# Patient Record
Sex: FEMALE | Race: WHITE | NOT HISPANIC OR LATINO | Employment: FULL TIME | URBAN - METROPOLITAN AREA
[De-identification: names, ages, dates, MRNs, and addresses within clinical notes are randomized per-mention and may not be internally consistent; named-entity substitution may affect disease eponyms.]

---
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
## 2023-06-01 ENCOUNTER — REFERRAL TRIAGE (OUTPATIENT)
Dept: LABOR AND DELIVERY | Facility: HOSPITAL | Age: 18
End: 2023-06-01

## 2023-06-01 ENCOUNTER — PATIENT OUTREACH (OUTPATIENT)
Dept: LABOR AND DELIVERY | Facility: HOSPITAL | Age: 18
End: 2023-06-01

## 2023-06-05 ENCOUNTER — PATIENT OUTREACH (OUTPATIENT)
Dept: LABOR AND DELIVERY | Facility: HOSPITAL | Age: 18
End: 2023-06-05
Payer: COMMERCIAL

## 2023-06-05 NOTE — OUTREACH NOTE
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 Yes   Dentist Name Denzinger   Have you seen a dentist in the last 6 months No  [has been within the past year, encouraged to make appointment for check up to support pregnancy health]   Resources Presently Utilizing: WIC (Women, Infant, Children)   Maternal Warning Signs Provided   Warning signs comment: sent in [employer]   Other: Provided  [second trimester, warning signs, nutrition, prenatal care, smoking/vaping]   Other Education How to find a pediatrician, Smoking/Vaping Cessation            Learning Assessment      Flowsheet Row Responses   Relationship Patient   Does the learner have any barriers to learning? No Barriers   What is the preferred language of the learner for medical teaching? English   Is an  required? No   How does the learner prefer to learn new concepts? Listening, Reading, Pictures/Video                Referral submitted to the following resources (verbal consent received to submit demographic information):     [facility]    Tobacco, Alcohol, and Drug History     reports that she quit smoking about 22 months ago. Her smoking use included cigarettes and electronic cigarette. She started smoking about 7 years ago. She has never used smokeless tobacco.   reports that she does not currently use alcohol.   reports that she does not currently use drugs after having used the following drugs: Marijuana.    Anitra Castellanos RN  Maternity Nurse Navigator    6/5/2023, 18:11 EDT

## 2023-06-05 NOTE — OUTREACH NOTE
[facility]  Enrollment    Current Estimated Gestational Age: 15w1d    Questions/Answers      Flowsheet Row Responses   Would like to participate? Yes   Date of Intake Visit 06/05/23                Anitra Castellanos RN  Maternity Nurse Navigator    6/5/2023, 16:56 EDT

## 2023-08-09 ENCOUNTER — APPOINTMENT (OUTPATIENT)
Dept: ULTRASOUND IMAGING | Facility: HOSPITAL | Age: 18
End: 2023-08-09
Payer: COMMERCIAL

## 2023-08-09 ENCOUNTER — HOSPITAL ENCOUNTER (OUTPATIENT)
Facility: HOSPITAL | Age: 18
Discharge: HOME | End: 2023-08-09
Attending: OBSTETRICS & GYNECOLOGY | Admitting: OBSTETRICS & GYNECOLOGY
Payer: COMMERCIAL

## 2023-08-09 ENCOUNTER — HOSPITAL ENCOUNTER (EMERGENCY)
Facility: HOSPITAL | Age: 18
Discharge: HOME | End: 2023-08-09
Attending: EMERGENCY MEDICINE
Payer: COMMERCIAL

## 2023-08-09 VITALS
TEMPERATURE: 98 F | HEIGHT: 66 IN | BODY MASS INDEX: 36.35 KG/M2 | HEART RATE: 71 BPM | DIASTOLIC BLOOD PRESSURE: 57 MMHG | SYSTOLIC BLOOD PRESSURE: 109 MMHG | OXYGEN SATURATION: 99 % | WEIGHT: 226.19 LBS | RESPIRATION RATE: 16 BRPM

## 2023-08-09 LAB
BILIRUB UR QL STRIP: NEGATIVE
CLARITY UR: ABNORMAL
COLOR UR: YELLOW
GLUCOSE UR STRIP-MCNC: NEGATIVE MG/DL
HGB UR QL STRIP.AUTO: NEGATIVE
KETONES UR QL STRIP: ABNORMAL
LEUKOCYTE ESTERASE UR QL STRIP.AUTO: NEGATIVE
NITRITE UR QL STRIP: NEGATIVE
PH UR STRIP.AUTO: 6.5 [PH] (ref 5–8)
PROT UR QL STRIP: NEGATIVE
SP GR UR STRIP: 1.02 (ref 1–1.03)
UROBILINOGEN UR QL STRIP: ABNORMAL

## 2023-08-09 PROCEDURE — 99211 OFF/OP EST MAY X REQ PHY/QHP: CPT | Performed by: EMERGENCY MEDICINE

## 2023-08-09 PROCEDURE — 87086 URINE CULTURE/COLONY COUNT: CPT | Performed by: OBSTETRICS & GYNECOLOGY

## 2023-08-09 PROCEDURE — 81003 URINALYSIS AUTO W/O SCOPE: CPT | Performed by: OBSTETRICS & GYNECOLOGY

## 2023-08-09 PROCEDURE — 76817 TRANSVAGINAL US OBSTETRIC: CPT

## 2023-08-09 PROCEDURE — G0463 HOSPITAL OUTPT CLINIC VISIT: HCPCS

## 2023-08-09 RX ORDER — PRENATAL VIT/IRON FUM/FOLIC AC 27MG-0.8MG
1 TABLET ORAL DAILY
COMMUNITY

## 2023-08-09 NOTE — LETTER
August 9, 2023     Patient: Trupti Rae   YOB: 2005   Date of Visit: 8/9/2023       To Whom It May Concern:    It is my medical opinion that Trupti Rae may return to work on 08/10/2023 . Please excuse her absence on 8/9/23.           Sincerely,    Mihaela PANDA RNC

## 2023-08-09 NOTE — NURSING NOTE
[facility] for vaginal bleeding this morning after having sex.  Pt states noting a blood clot, dk red, in toilet when she went to the bathroom.  Pt denies any further bleeding, none noted per RN.  Pt states occasional mild cramping. Pt states vomiting some the last 3 days. She states she can keep down water and some bland foods and string cheese.  No abdominal tenderness noted, abdomen soft to palpation per RN. Pt states (+) fm. Pt significant other at bedside and supportive.

## 2023-08-09 NOTE — LETTER
August 9, 2023     Patient: Trupti Rae   YOB: 2005   Date of Visit: 8/9/2023       To Whom It May Concern:    It is my medical opinion that Peter Cervantes may return to work on 08/10/2023 . Please excuse his absence on 8/9/23.           Sincerely,    Mihaela PANDA RNC

## 2023-08-10 LAB — BACTERIA SPEC AEROBE CULT: NO GROWTH

## 2023-08-14 ENCOUNTER — PATIENT OUTREACH (OUTPATIENT)
Dept: LABOR AND DELIVERY | Facility: HOSPITAL | Age: 18
End: 2023-08-14
Payer: COMMERCIAL

## 2023-08-14 NOTE — OUTREACH NOTE
[facility]  Unable to Reach       Questions/Answers      Flowsheet Row Responses   Pending Outreach Prenatal Check-in   Call Attempt First   Outcome No answer/busy, MyChart message sent to patient   Next Call Attempt Date 08/16/23            Attempt to reach patient for Pncheck in, phone rings and then busy signal, no option to leave voicemail.     Anitra Castellanos RN  Maternity Nurse Navigator    8/14/2023, 15:22 EDT

## 2023-08-16 ENCOUNTER — PATIENT OUTREACH (OUTPATIENT)
Dept: LABOR AND DELIVERY | Facility: HOSPITAL | Age: 18
End: 2023-08-16
Payer: COMMERCIAL

## 2023-08-16 NOTE — OUTREACH NOTE
[facility]  Unable to Reach       Questions/Answers      Flowsheet Row Responses   Pending Outreach Prenatal Check-in   Call Attempt Second   Outcome No answer/busy, MyChart message sent to patient   Unable to reach comments: phone rings and then is a busy signal, no option to leave a voicemail                Anitra Castellanos RN  Maternity Nurse Navigator    8/16/2023, 13:10 EDT

## 2023-09-21 ENCOUNTER — HOSPITAL ENCOUNTER (EMERGENCY)
Facility: HOSPITAL | Age: 18
Discharge: HOME | End: 2023-09-21
Payer: COMMERCIAL

## 2023-09-21 VITALS
DIASTOLIC BLOOD PRESSURE: 57 MMHG | SYSTOLIC BLOOD PRESSURE: 102 MMHG | OXYGEN SATURATION: 98 % | RESPIRATION RATE: 16 BRPM | HEIGHT: 66 IN | WEIGHT: 239.86 LBS | TEMPERATURE: 98.4 F | BODY MASS INDEX: 38.55 KG/M2 | HEART RATE: 76 BPM

## 2023-09-21 DIAGNOSIS — M54.32 SCIATICA OF LEFT SIDE: Primary | ICD-10-CM

## 2023-09-21 PROCEDURE — 99282 EMERGENCY DEPT VISIT SF MDM: CPT

## 2023-09-21 NOTE — ED PROVIDER NOTES
Subjective   History of Present Illness  Chief Complaint: Hip pain    Patient is a 18-year-old  female currently 30 weeks pregnant, with history of anxiety depression presents to the ER with complaints of left hip pain for a few days.  She denies any fall trauma or injury.  Patient states the pain is worse when she tries to get out of bed or go from sitting to standing.  She states the pain is mostly in the left buttocks area and sometimes radiates down the back of her leg.  She states the pain is worse with activity, rates a 4/10, currently at rest she has no pain.  She has no leg swelling calf pain or redness.  No groin pain or abdominal pain.  No saddle anesthesia or bladder or bowel dysfunction.  No chest pain or shortness of breath.    PCP: Barb Willingham    History provided by:  Patient    Review of Systems   Constitutional:  Negative for chills and fever.   HENT:  Negative for sore throat and trouble swallowing.    Eyes: Negative.    Respiratory:  Negative for shortness of breath and wheezing.    Cardiovascular:  Negative for chest pain.   Gastrointestinal:  Negative for abdominal pain, diarrhea, nausea and vomiting.   Endocrine: Negative.    Genitourinary:  Negative for dysuria.   Musculoskeletal:  Positive for arthralgias. Negative for back pain, myalgias and neck pain.   Skin:  Negative for rash.   Allergic/Immunologic: Negative.    Neurological:  Negative for headaches.   Psychiatric/Behavioral:  Negative for behavioral problems.    All other systems reviewed and are negative.    Past Medical History:   Diagnosis Date    Anxiety     Depression     Menstrual pain        Allergies   Allergen Reactions    Cashew Nut Swelling       Past Surgical History:   Procedure Laterality Date    ENDOSCOPY      WISDOM TOOTH EXTRACTION         Family History   Problem Relation Age of Onset    Diabetes Father     Mental illness Brother        Social History     Socioeconomic History    Marital status: Single      Spouse name: Peter Glass    Number of children: 0   Tobacco Use    Smoking status: Former     Types: Cigarettes, Electronic Cigarette     Start date: 2016     Quit date: 2021     Years since quittin.1    Smokeless tobacco: Never   Vaping Use    Vaping Use: Every day    Substances: Nicotine    Devices: Pre-filled or refillable cartridge   Substance and Sexual Activity    Alcohol use: Not Currently    Drug use: Not Currently     Types: Marijuana    Sexual activity: Yes     Partners: Male           Objective   Physical Exam  Vitals and nursing note reviewed.   Constitutional:       Appearance: Normal appearance. She is well-developed and normal weight. She is not ill-appearing or toxic-appearing.   HENT:      Head: Normocephalic and atraumatic.   Eyes:      Pupils: Pupils are equal, round, and reactive to light.   Cardiovascular:      Rate and Rhythm: Normal rate and regular rhythm.      Pulses: Normal pulses.      Heart sounds: Normal heart sounds. No murmur heard.  Pulmonary:      Effort: Pulmonary effort is normal. No respiratory distress.      Breath sounds: Normal breath sounds. No wheezing.   Abdominal:      General: Bowel sounds are normal. There is no distension.      Palpations: Abdomen is soft.      Tenderness: There is no abdominal tenderness.   Musculoskeletal:         General: Tenderness present. No signs of injury. Normal range of motion.      Right lower leg: No edema.      Left lower leg: No edema.      Comments: Lumbar spine: No step-offs or deformities, no midline tenderness to palpation.  Bilateral lower extremities: Negative straight leg raise.  5 out of 5 strength.  Sensation intact to light touch.  2+ reflexes.  No clonus.  Negative Babinski sign.    Homans' sign negative  Tenderness to palpation near piriformis and left buttocks   Skin:     General: Skin is warm and dry.      Capillary Refill: Capillary refill takes less than 2 seconds.      Findings: No rash.   Neurological:      " General: No focal deficit present.      Mental Status: She is alert and oriented to person, place, and time.   Psychiatric:         Mood and Affect: Mood normal.         Behavior: Behavior normal.       Procedures           ED Course  ED Course as of 09/21/23 1820   Thu Sep 21, 2023   0801 Contacted PT department, PT provider will not be in the ER for at least another hour [MC]      ED Course User Index  [MC] Joann Harp, PA    /57   Pulse 76   Temp 98.4 °F (36.9 °C) (Oral)   Resp 16   Ht 167.6 cm (66\")   Wt 109 kg (239 lb 13.8 oz)   LMP 07/23/2022 (Exact Date)   SpO2 98%   BMI 38.71 kg/m²   Labs Reviewed - No data to display  Medications - No data to display  No radiology results for the last day                                         Medical Decision Making  Differential Dx (Includes but not limited to): Fracture contusion, DVT, sciatica  Medical Records Reviewed: No pertinent records to review  Labs: N/A  Imaging: Considered, not done given no history of trauma, patient 30 weeks pregnant   Telemetry: N/A  Testing considered but not ordered: X-ray of the hip, patient is pregnant, did not want to radiate that area, physical exam and history consistent with sciatica  Nature of Complaint: Acute  Admission vs Discharge: Discharge  Discussion: While in the ED appropriate PPE was worn during exam and throughout all encounters with the patient.  Patient afebrile, nontoxic appearance in no acute distress.  She is complaining of left buttocks pain, HPI and physical exam consistent with sciatica.  There are no red flag warning signs, no saddle anesthesia, no bladder or bowel dysfunction, no weakness.  Patient able to ambulate without difficulty.  She denies abdominal pain vaginal bleeding or discharge.  Fetal heart tones 152.  Recommended NSAIDs therapy outpatient, specifically Tylenol, some light stretching and light heating pad.  She will be given ambulatory referral to physical therapy.  X-ray not " obtained as she has no history of trauma, patient is pregnant and wanted to avoid unnecessary radiation.    Discharge plan and instructions were discussed with the patient who verbalized understanding and is in agreement with the plan, all questions were answered at this time.  Patient is aware of signs symptoms that would require immediate return to the emergency room.  Patient understands importance of following up with primary care provider for further evaluation and worsening concerns as well as blood pressure recheck in the next 4 weeks.    Patient was discharged in improved stable condition with an upright steady gait.    Patient is aware that discharge does not mean that nothing is wrong but indicates no emergencies present and they must continue care with follow-up as given below or physician of their choice.    Problems Addressed:  Sciatica of left side: acute illness or injury    Risk  OTC drugs.        Final diagnoses:   Sciatica of left side       ED Disposition  ED Disposition       ED Disposition   Discharge    Condition   Stable    Comment   --               Barb Willingham MD  [address] [place] [address] [phone]    Schedule an appointment as soon as possible for a visit in 2 days  As needed, If symptoms worsen    Nay Porter MD  [address] [facility] [place] [address] [phone]    Schedule an appointment as soon as possible for a visit in 2 days  As needed, If symptoms worsen         Medication List      No changes were made to your prescriptions during this visit.            Joann Harp PA  09/21/23 5908

## 2023-09-21 NOTE — DISCHARGE INSTRUCTIONS
Tylenol as needed for pain.  May use heating pad to the hip and short increments for additional pain relief.  May also do light stretching.    I referred you to physical therapy, this could help with some pain control    Follow-up with PCP and OB/GYN for recheck    Return to the ER for new or worsening symptoms

## 2023-09-21 NOTE — Clinical Note
[facility] EMERGENCY DEPARTMENT  [address] [facility] [place] [address]  Phone: [phone]    Trupti Rae was seen and treated in our emergency department on 9/21/2023.  She may return to work on 09/22/2023.         Thank you for choosing [facility].    Eliza Mason RN

## 2023-09-25 ENCOUNTER — TREATMENT (OUTPATIENT)
Dept: PHYSICAL THERAPY | Facility: CLINIC | Age: 18
End: 2023-09-25

## 2023-09-25 DIAGNOSIS — M79.605 LEFT LEG PAIN: ICD-10-CM

## 2023-09-25 DIAGNOSIS — M54.32 LEFT SIDED SCIATICA: Primary | ICD-10-CM

## 2023-09-25 DIAGNOSIS — Z74.09 IMPAIRED MOBILITY AND ACTIVITIES OF DAILY LIVING: ICD-10-CM

## 2023-09-25 DIAGNOSIS — Z78.9 IMPAIRED MOBILITY AND ACTIVITIES OF DAILY LIVING: ICD-10-CM

## 2023-09-25 NOTE — PROGRESS NOTES
Physical Therapy Initial Evaluation and Plan of Care     [address] [facility] [address], [address] [place]  [address]    Patient: Trupti Rae   : 2005  Diagnosis/ICD-10 Code:  Left sided sciatica [M54.32]  Referring practitioner: Barb Willingham MD  Date of Initial Visit: 2023  Today's Date: 2023  Patient seen for 1 sessions           Subjective Questionnaire: Oswestry: 44%    Subjective Evaluation    History of Present Illness  Mechanism of injury: Pt reports she has had left hip and leg pain for 3-4 weeks.  She went to the ER and they said she had sciatica due to pregnancy.  She is 31 weeks.    She has difficulty with walking, getting out of bed and going from sit to stand      Patient Occupation: Registered behavior tech Pain  Current pain ratin  At best pain ratin  At worst pain rating: 10       PRECAUTIONS: anxiety, depression    Objective        Special Questions  Patient is experiencing disturbed sleep.     Additional Special Questions  When moving in bed      Tenderness     Additional Tenderness Details  TTP:  left SIJ; buttock; left lateral hip iliac crest    Active Range of Motion     Lumbar   Flexion: WFL  Extension: WFL and with pain  Left lateral flexion: WFL  Right lateral flexion: WFL    Strength/Myotome Testing     Lumbar   Left   Normal strength    Right   Normal strength    Tests     Lumbar     Left   Negative passive SLR and quadrant.     Right   Negative passive SLR and quadrant.     Left Pelvic Girdle/Sacrum   Positive: sacrum compression, gapping and sacral spring.     Right Pelvic Girdle/Sacrum   Positive: sacrum compression, gapping and sacral spring.      General Comments     Lumbar Comments  Pt anmb (I) without AD; some limping on LLE after sit to stand from mat table       Assessment & Plan       Assessment  Impairments: abnormal gait, activity intolerance, lacks appropriate home exercise program, pain with function, safety issue and weight-bearing intolerance   Functional  limitations: walking, sitting, standing, stooping and unable to perform repetitive tasks (Transition from sitting to standing)  Assessment details: Pt is a 17 y/o female with a dx of left side sciatica onset 3-4 weeks ago.  She is 31 weeks pregnant.    She has difficulty with walking, getting out of bed and going from sit to stand    Pt exhibits the above impairments and functional limitations and would benefit from skilled PT to address those areas and maximize function.   Prognosis: good    Goals  Plan Goals: STGs:  6 weeks  1.  Pt to tolerate initial HEP without inc pain.  2.  Pt to tolerate advancement of HEP without inc pain.  3.  Pt to report left hip and leg pain has decreased by at least 25%.  4.  Pt to improve stabilization of sacral base with flex to ext transitions as evidenced by neg SI compression and gapping testing.    5.  Pt to obtain SI belt and use regularly prior to next visit.      LTGs:  12 weeks  1.  Pt to be (I) with HEP to manage own condition.  2.  Pt to report left hip and leg pain has decreased by at least 75% to allow better tolerance to and performance of walking normally .  3.  Pt to improve stabilization of sacrum and pt reporting no pain upon standing from sitting or with moving in bed.    4.  Pt to exhibit improved function as indicated by improved score on Oswestry  vs score at evaluation.    5.  Pt to demonstrate (I) and appropriate use of body mechanics for daily home and/or work tasks.     Plan  Therapy options: will be seen for skilled therapy services  Planned modality interventions: cryotherapy and thermotherapy (hydrocollator packs)  Planned therapy interventions: abdominal trunk stabilization, body mechanics training, functional ROM exercises, home exercise program, ADL retraining, joint mobilization, manual therapy, motor coordination training, neuromuscular re-education, postural training, soft tissue mobilization, spinal/joint mobilization, strengthening, stretching and  therapeutic activities  Frequency: 2x week  Treatment plan discussed with: patient  Plan details: 12 weeks     See flowsheet for treatment details.    History # of Personal Factors and/or Comorbidities: LOW (0)  Examination of Body System(s): # of elements: LOW (1-2)  Clinical Presentation: STABLE   Clinical Decision Making: LOW     Timed:         Manual Therapy:         mins  28490;     Therapeutic Exercise:   10      mins  65335;     Neuromuscular Bobby:  8      mins  29835;    Therapeutic Activity:          mins  91255;     Gait Training:           mins  36866;     Ultrasound:          mins  10022;    Ionto:                                   mins   48506  Self Care:                     15       mins   39140    Un-Timed:  Electrical Stimulation:         mins  22357 ( );  Traction          mins 75968  Canalith Repos.          ___  mins  28861  Low Eval   30       Mins  61853  Mod Eval          Mins  59696  High Eval                            Mins  32510  Re-Eval                               mins  73608    Timed Treatment:  33    mins   Total Treatment:   63     mins    PT SIGNATURE: Zainab Hoffman, PT   [place] PT Lic. # [id number]    DATE TREATMENT INITIATED: 9/25/2023    Initial Certification  Certification Period: 12/23/2023  I certify that the therapy services are furnished while this patient is under my care.  The services outlined above are required by this patient, and will be reviewed every 90 days.     PHYSICIAN: Barb Willingham MD  NPI:                                        DATE:     Please sign and return via fax to [phone].. Thank you, [facility].

## 2023-09-28 ENCOUNTER — HOSPITAL ENCOUNTER (OUTPATIENT)
Facility: HOSPITAL | Age: 18
Discharge: HOME | End: 2023-09-28
Attending: STUDENT IN AN ORGANIZED HEALTH CARE EDUCATION/TRAINING PROGRAM | Admitting: STUDENT IN AN ORGANIZED HEALTH CARE EDUCATION/TRAINING PROGRAM
Payer: COMMERCIAL

## 2023-09-28 VITALS
OXYGEN SATURATION: 98 % | SYSTOLIC BLOOD PRESSURE: 114 MMHG | TEMPERATURE: 98.3 F | DIASTOLIC BLOOD PRESSURE: 62 MMHG | RESPIRATION RATE: 18 BRPM | HEART RATE: 71 BPM

## 2023-09-28 LAB — HGB F BLD QL KLEIH BETKE: NORMAL

## 2023-09-28 PROCEDURE — G0463 HOSPITAL OUTPT CLINIC VISIT: HCPCS

## 2023-09-28 PROCEDURE — 85460 HEMOGLOBIN FETAL: CPT | Performed by: STUDENT IN AN ORGANIZED HEALTH CARE EDUCATION/TRAINING PROGRAM

## 2023-09-28 RX ORDER — PROMETHAZINE HYDROCHLORIDE 25 MG/1
25 TABLET ORAL EVERY 8 HOURS PRN
COMMUNITY

## 2023-09-28 NOTE — LETTER
September 28, 2023     Patient: Trupti Rae   YOB: 2005   Date of Visit: 8/9/2023       To Whom It May Concern:    It is my medical opinion that Trupti Rae may return to work on 09/29/23 .           Sincerely,      Shannon Aburto RN

## 2023-09-28 NOTE — SIGNIFICANT NOTE
Notified MD that  presents at 31w4d s/p fall at 0700 this AM. Pt hit the right side of her abd and back. No front abd trauma. No bruising or lacerations present. Abd soft to palpate. RH positive. Orders received for KB stain and observe for 4hrs.

## 2023-09-28 NOTE — PLAN OF CARE
Goal Outcome Evaluation:  Plan of Care Reviewed With: patient, significant other        Progress: improving      Patient discharged home. Pt reports occasional cramping. Abd palpated soft, denies vag bleeding, + fm. KB stain negative. Category 1 strip

## 2023-10-04 ENCOUNTER — TREATMENT (OUTPATIENT)
Dept: PHYSICAL THERAPY | Facility: CLINIC | Age: 18
End: 2023-10-04
Payer: COMMERCIAL

## 2023-10-04 DIAGNOSIS — Z74.09 IMPAIRED MOBILITY AND ACTIVITIES OF DAILY LIVING: ICD-10-CM

## 2023-10-04 DIAGNOSIS — M79.605 LEFT LEG PAIN: ICD-10-CM

## 2023-10-04 DIAGNOSIS — M54.32 LEFT SIDED SCIATICA: Primary | ICD-10-CM

## 2023-10-04 DIAGNOSIS — Z78.9 IMPAIRED MOBILITY AND ACTIVITIES OF DAILY LIVING: ICD-10-CM

## 2023-10-04 NOTE — PROGRESS NOTES
Physical Therapy Daily Treatment Note    Patient: Trupti Rae   : 2005  Referring practitioner: SINDY Vazquez  Diagnoses: Left sided sciatica [M54.32]  Today's Date: 10/4/2023    VISIT#: 2    Subjective Evaluation    History of Present Illness  Mechanism of injury: Pt reports she has been doing some better but still with pain.  She got the belt and it seems to help some with walking and getting up from sitting.      Pain  Current pain ratin  Location: at rest;  3/10 sit to stand; 2-3/10 walking       Objective     See Exercise, Manual, and Modality Logs for complete treatment.     Assessed fit of belt; needed to move distally and pt was able to see and feel the difference with the new position.  Educ on importance of belt position    Trial of ex with belt on in supine:  less pain then without the belt    Trial of MET left LE flex and ext: flex inc pain and ext eased the pain  Pt will perform with assistance in sup and (I) in sitting    Assessment & Plan       Assessment  Assessment details: Mino well today with great improvement in pain with SI belt during walking and sit to stands from evaluation levels      Progress per Plan of Care        Timed:         Manual Therapy:         mins  30054;     Therapeutic Exercise:    8     mins  46051;     Neuromuscular Bobby:  12      mins  55403;    Therapeutic Activity:          mins  58437;     Gait Training:           mins  11487;     Ultrasound:          mins  67625;    Ionto                                   mins   77706  Self Care                      10      mins   46789      Un-Timed:  Electrical Stimulation:         mins  87254 ( );  Traction          mins 67518  Canalith Repos                   mins  32186  Low Eval          Mins  35569  Mod Eval          Mins  12812  High Eval                            Mins  05754  Re-Eval                               mins  94866    Timed Treatment:  30    mins   Total Treatment:    30    mins    Zainab  Dalton PT  Physical Therapist  [place] PT Lic. # [id number]

## 2023-10-09 ENCOUNTER — TELEPHONE (OUTPATIENT)
Dept: PHYSICAL THERAPY | Facility: CLINIC | Age: 18
End: 2023-10-09

## 2023-10-09 NOTE — TELEPHONE ENCOUNTER
Caller: Peter Glass    Relationship: Emergency Contact         What was the call regarding: LARY YAP

## 2023-10-23 ENCOUNTER — DOCUMENTATION (OUTPATIENT)
Dept: PHYSICAL THERAPY | Facility: CLINIC | Age: 18
End: 2023-10-23

## 2023-10-23 NOTE — PROGRESS NOTES
Discharge Summary  Discharge Summary from PhysicalTherapy    Patient: Trupti Rae   : 2005  Diagnosis/ICD-10 Code: L side sciatica    Today's Date: 10/23/2023    Patient seen for 2 visits.    Discharge Status of Patient: See 10-4-23 treatment note for detail.    Goals: Not Met    Discharge Plan: Patient to return to referring/providing physician    Comments:  pt called to say she cannot make it in for PT anymore due to now working full time hours.  DC PT at this time.    Thank you for this referral to [facility]    SIGNATURE: Zainab Hoffman, PT

## 2023-10-30 ENCOUNTER — PATIENT OUTREACH (OUTPATIENT)
Dept: LABOR AND DELIVERY | Facility: HOSPITAL | Age: 18
End: 2023-10-30
Payer: COMMERCIAL

## 2023-10-30 NOTE — OUTREACH NOTE
[facility]  Unable to Reach       Questions/Answers      Flowsheet Row Responses   Pending Outreach Prenatal Check-in   Call Attempt First   Outcome No answer/busy, Missing or invalid number   Unable to reach comments: number no longer in service                Anitra Castellanos RN  Maternity Nurse Navigator    10/30/2023, 12:39 EDT

## 2023-10-30 NOTE — OUTREACH NOTE
[facility]  Unable to Reach       Questions/Answers      Flowsheet Row Responses   Pending Outreach Prenatal Check-in   Call Attempt Second   Outcome Left message, Zipzoomhart message sent to patient   Next Call Attempt Date 11/01/23                Anitra Castellanos RN  Maternity Nurse Navigator    10/30/2023, 12:41 EDT

## 2023-11-01 ENCOUNTER — PATIENT OUTREACH (OUTPATIENT)
Dept: LABOR AND DELIVERY | Facility: HOSPITAL | Age: 18
End: 2023-11-01
Payer: COMMERCIAL

## 2023-11-01 NOTE — OUTREACH NOTE
[facility]  Unable to Reach       Questions/Answers      Flowsheet Row Responses   Pending Outreach Prenatal Check-in   Call Attempt Third   Outcome MyChart message sent to patient   Unable to reach comments: number disconnected                Anitra Castellanos RN  Maternity Nurse Navigator    11/1/2023, 13:41 EDT

## 2023-11-17 ENCOUNTER — HOSPITAL ENCOUNTER (INPATIENT)
Facility: HOSPITAL | Age: 18
LOS: 3 days | Discharge: HOME | End: 2023-11-20
Attending: STUDENT IN AN ORGANIZED HEALTH CARE EDUCATION/TRAINING PROGRAM | Admitting: STUDENT IN AN ORGANIZED HEALTH CARE EDUCATION/TRAINING PROGRAM
Payer: COMMERCIAL

## 2023-11-17 ENCOUNTER — HOSPITAL ENCOUNTER (OUTPATIENT)
Dept: LABOR AND DELIVERY | Facility: HOSPITAL | Age: 18
Discharge: HOME | End: 2023-11-17
Payer: COMMERCIAL

## 2023-11-17 PROBLEM — Z34.90 ENCOUNTER FOR INDUCTION OF LABOR: Status: ACTIVE | Noted: 2023-11-17

## 2023-11-17 LAB
ABO GROUP BLD: NORMAL
BLD GP AB SCN SERPL QL: NEGATIVE
DEPRECATED RDW RBC AUTO: 42 FL (ref 37–54)
ERYTHROCYTE [DISTWIDTH] IN BLOOD BY AUTOMATED COUNT: 13.6 % (ref 12.3–15.4)
HCT VFR BLD AUTO: 38.4 % (ref 34–46.6)
HGB BLD-MCNC: 13 G/DL (ref 12–15.9)
MCH RBC QN AUTO: 30.3 PG (ref 26.6–33)
MCHC RBC AUTO-ENTMCNC: 33.9 G/DL (ref 31.5–35.7)
MCV RBC AUTO: 89.2 FL (ref 79–97)
PLATELET # BLD AUTO: 280 10*3/MM3 (ref 140–450)
PMV BLD AUTO: 8.2 FL (ref 6–12)
RBC # BLD AUTO: 4.31 10*6/MM3 (ref 3.77–5.28)
RH BLD: POSITIVE
T&S EXPIRATION DATE: NORMAL
WBC NRBC COR # BLD AUTO: 12 10*3/MM3 (ref 3.4–10.8)

## 2023-11-17 PROCEDURE — 85027 COMPLETE CBC AUTOMATED: CPT | Performed by: STUDENT IN AN ORGANIZED HEALTH CARE EDUCATION/TRAINING PROGRAM

## 2023-11-17 PROCEDURE — 86900 BLOOD TYPING SEROLOGIC ABO: CPT | Performed by: STUDENT IN AN ORGANIZED HEALTH CARE EDUCATION/TRAINING PROGRAM

## 2023-11-17 PROCEDURE — 86900 BLOOD TYPING SEROLOGIC ABO: CPT

## 2023-11-17 PROCEDURE — 86901 BLOOD TYPING SEROLOGIC RH(D): CPT | Performed by: STUDENT IN AN ORGANIZED HEALTH CARE EDUCATION/TRAINING PROGRAM

## 2023-11-17 PROCEDURE — 86850 RBC ANTIBODY SCREEN: CPT | Performed by: STUDENT IN AN ORGANIZED HEALTH CARE EDUCATION/TRAINING PROGRAM

## 2023-11-17 PROCEDURE — 86901 BLOOD TYPING SEROLOGIC RH(D): CPT

## 2023-11-17 PROCEDURE — 3E033VJ INTRODUCTION OF OTHER HORMONE INTO PERIPHERAL VEIN, PERCUTANEOUS APPROACH: ICD-10-PCS | Performed by: STUDENT IN AN ORGANIZED HEALTH CARE EDUCATION/TRAINING PROGRAM

## 2023-11-17 PROCEDURE — 86592 SYPHILIS TEST NON-TREP QUAL: CPT | Performed by: STUDENT IN AN ORGANIZED HEALTH CARE EDUCATION/TRAINING PROGRAM

## 2023-11-17 RX ORDER — SODIUM CHLORIDE 0.9 % (FLUSH) 0.9 %
10 SYRINGE (ML) INJECTION EVERY 8 HOURS
Status: CANCELLED | OUTPATIENT
Start: 2023-11-17

## 2023-11-17 RX ORDER — SODIUM CHLORIDE 0.9 % (FLUSH) 0.9 %
10 SYRINGE (ML) INJECTION AS NEEDED
Status: CANCELLED | OUTPATIENT
Start: 2023-11-17

## 2023-11-17 RX ORDER — SODIUM CHLORIDE 0.9 % (FLUSH) 0.9 %
10 SYRINGE (ML) INJECTION AS NEEDED
Status: DISCONTINUED | OUTPATIENT
Start: 2023-11-17 | End: 2023-11-20 | Stop reason: HOSPADM

## 2023-11-17 RX ORDER — ACETAMINOPHEN 325 MG/1
650 TABLET ORAL EVERY 6 HOURS PRN
Status: CANCELLED | OUTPATIENT
Start: 2023-11-17

## 2023-11-17 RX ORDER — ACETAMINOPHEN 325 MG/1
650 TABLET ORAL EVERY 6 HOURS PRN
Status: DISCONTINUED | OUTPATIENT
Start: 2023-11-17 | End: 2023-11-20 | Stop reason: HOSPADM

## 2023-11-17 RX ORDER — SODIUM CHLORIDE 0.9 % (FLUSH) 0.9 %
10 SYRINGE (ML) INJECTION EVERY 12 HOURS SCHEDULED
Status: DISCONTINUED | OUTPATIENT
Start: 2023-11-17 | End: 2023-11-20

## 2023-11-17 RX ORDER — SODIUM CHLORIDE 0.9 % (FLUSH) 0.9 %
10 SYRINGE (ML) INJECTION EVERY 8 HOURS
Status: DISCONTINUED | OUTPATIENT
Start: 2023-11-17 | End: 2023-11-20

## 2023-11-17 RX ORDER — SODIUM CHLORIDE 0.9 % (FLUSH) 0.9 %
10 SYRINGE (ML) INJECTION EVERY 12 HOURS SCHEDULED
Status: CANCELLED | OUTPATIENT
Start: 2023-11-17

## 2023-11-17 RX ADMIN — DINOPROSTONE 10 MG: 10 INSERT VAGINAL at 21:35

## 2023-11-17 RX ADMIN — Medication 10 ML: at 21:26

## 2023-11-17 RX ADMIN — DOXYLAMINE SUCCINATE 25 MG: 25 TABLET ORAL at 22:48

## 2023-11-18 ENCOUNTER — ANESTHESIA (OUTPATIENT)
Dept: LABOR AND DELIVERY | Facility: HOSPITAL | Age: 18
End: 2023-11-18
Payer: COMMERCIAL

## 2023-11-18 ENCOUNTER — ANESTHESIA EVENT (OUTPATIENT)
Dept: LABOR AND DELIVERY | Facility: HOSPITAL | Age: 18
End: 2023-11-18
Payer: COMMERCIAL

## 2023-11-18 PROBLEM — O36.5990 FETAL GROWTH RESTRICTION ANTEPARTUM: Status: ACTIVE | Noted: 2023-11-18

## 2023-11-18 PROCEDURE — 25010000002 MORPHINE PER 10 MG: Performed by: STUDENT IN AN ORGANIZED HEALTH CARE EDUCATION/TRAINING PROGRAM

## 2023-11-18 PROCEDURE — 88307 TISSUE EXAM BY PATHOLOGIST: CPT | Performed by: STUDENT IN AN ORGANIZED HEALTH CARE EDUCATION/TRAINING PROGRAM

## 2023-11-18 PROCEDURE — 25810000003 LACTATED RINGERS PER 1000 ML: Performed by: STUDENT IN AN ORGANIZED HEALTH CARE EDUCATION/TRAINING PROGRAM

## 2023-11-18 PROCEDURE — 93005 ELECTROCARDIOGRAM TRACING: CPT | Performed by: STUDENT IN AN ORGANIZED HEALTH CARE EDUCATION/TRAINING PROGRAM

## 2023-11-18 PROCEDURE — 0HQ9XZZ REPAIR PERINEUM SKIN, EXTERNAL APPROACH: ICD-10-PCS | Performed by: STUDENT IN AN ORGANIZED HEALTH CARE EDUCATION/TRAINING PROGRAM

## 2023-11-18 PROCEDURE — 93010 ELECTROCARDIOGRAM REPORT: CPT | Performed by: INTERNAL MEDICINE

## 2023-11-18 PROCEDURE — C1755 CATHETER, INTRASPINAL: HCPCS | Performed by: ANESTHESIOLOGY

## 2023-11-18 RX ORDER — ONDANSETRON 4 MG/1
4 TABLET, FILM COATED ORAL EVERY 8 HOURS PRN
Status: DISCONTINUED | OUTPATIENT
Start: 2023-11-18 | End: 2023-11-20 | Stop reason: HOSPADM

## 2023-11-18 RX ORDER — FENTANYL 0.2 MG/100ML-BUPIV 0.125%-NACL 0.9% EPIDURAL INJ 2/0.125 MCG/ML-%
SOLUTION INJECTION CONTINUOUS
Status: DISCONTINUED | OUTPATIENT
Start: 2023-11-18 | End: 2023-11-20 | Stop reason: HOSPADM

## 2023-11-18 RX ORDER — BISACODYL 10 MG
10 SUPPOSITORY, RECTAL RECTAL DAILY PRN
Status: DISCONTINUED | OUTPATIENT
Start: 2023-11-19 | End: 2023-11-20 | Stop reason: HOSPADM

## 2023-11-18 RX ORDER — OXYTOCIN/0.9 % SODIUM CHLORIDE 30/500 ML
2-20 PLASTIC BAG, INJECTION (ML) INTRAVENOUS
Status: DISCONTINUED | OUTPATIENT
Start: 2023-11-18 | End: 2023-11-20 | Stop reason: HOSPADM

## 2023-11-18 RX ORDER — PRENATAL VIT/IRON FUM/FOLIC AC 27MG-0.8MG
1 TABLET ORAL DAILY
Status: DISCONTINUED | OUTPATIENT
Start: 2023-11-18 | End: 2023-11-20 | Stop reason: HOSPADM

## 2023-11-18 RX ORDER — OXYTOCIN/0.9 % SODIUM CHLORIDE 30/500 ML
999 PLASTIC BAG, INJECTION (ML) INTRAVENOUS ONCE
Status: DISCONTINUED | OUTPATIENT
Start: 2023-11-18 | End: 2023-11-18 | Stop reason: HOSPADM

## 2023-11-18 RX ORDER — MISOPROSTOL 200 UG/1
800 TABLET ORAL ONCE AS NEEDED
Status: DISCONTINUED | OUTPATIENT
Start: 2023-11-18 | End: 2023-11-18

## 2023-11-18 RX ORDER — METHYLERGONOVINE MALEATE 0.2 MG/ML
200 INJECTION INTRAVENOUS ONCE AS NEEDED
Status: DISCONTINUED | OUTPATIENT
Start: 2023-11-18 | End: 2023-11-18

## 2023-11-18 RX ORDER — FENTANYL 0.2 MG/100ML-BUPIV 0.125%-NACL 0.9% EPIDURAL INJ 2/0.125 MCG/ML-%
SOLUTION INJECTION
Status: COMPLETED
Start: 2023-11-18 | End: 2023-11-18

## 2023-11-18 RX ORDER — DOCUSATE SODIUM 100 MG/1
100 CAPSULE, LIQUID FILLED ORAL 2 TIMES DAILY
Status: DISCONTINUED | OUTPATIENT
Start: 2023-11-18 | End: 2023-11-20 | Stop reason: HOSPADM

## 2023-11-18 RX ORDER — LIDOCAINE HCL/EPINEPHRINE/PF 2%-1:200K
VIAL (ML) INJECTION
Status: DISPENSED
Start: 2023-11-18 | End: 2023-11-18

## 2023-11-18 RX ORDER — EPHEDRINE SULFATE 5 MG/ML
10 INJECTION INTRAVENOUS
Status: DISCONTINUED | OUTPATIENT
Start: 2023-11-18 | End: 2023-11-18

## 2023-11-18 RX ORDER — CARBOPROST TROMETHAMINE 250 UG/ML
250 INJECTION, SOLUTION INTRAMUSCULAR
Status: DISCONTINUED | OUTPATIENT
Start: 2023-11-18 | End: 2023-11-18

## 2023-11-18 RX ORDER — ACETAMINOPHEN 325 MG/1
650 TABLET ORAL EVERY 6 HOURS PRN
Status: DISCONTINUED | OUTPATIENT
Start: 2023-11-18 | End: 2023-11-18 | Stop reason: SDUPTHER

## 2023-11-18 RX ORDER — PRENATAL VIT/IRON FUM/FOLIC AC 27MG-0.8MG
1 TABLET ORAL DAILY
Status: DISCONTINUED | OUTPATIENT
Start: 2023-11-19 | End: 2023-11-18 | Stop reason: SDUPTHER

## 2023-11-18 RX ORDER — SODIUM CHLORIDE 0.9 % (FLUSH) 0.9 %
1-10 SYRINGE (ML) INJECTION AS NEEDED
Status: DISCONTINUED | OUTPATIENT
Start: 2023-11-18 | End: 2023-11-20 | Stop reason: HOSPADM

## 2023-11-18 RX ORDER — HYDROCORTISONE ACETATE PRAMOXINE HCL 2.5; 1 G/100G; G/100G
1 CREAM TOPICAL AS NEEDED
Status: DISCONTINUED | OUTPATIENT
Start: 2023-11-18 | End: 2023-11-20 | Stop reason: HOSPADM

## 2023-11-18 RX ORDER — SODIUM CHLORIDE, SODIUM LACTATE, POTASSIUM CHLORIDE, CALCIUM CHLORIDE 600; 310; 30; 20 MG/100ML; MG/100ML; MG/100ML; MG/100ML
125 INJECTION, SOLUTION INTRAVENOUS CONTINUOUS
Status: DISCONTINUED | OUTPATIENT
Start: 2023-11-18 | End: 2023-11-20 | Stop reason: HOSPADM

## 2023-11-18 RX ORDER — OXYTOCIN/0.9 % SODIUM CHLORIDE 30/500 ML
250 PLASTIC BAG, INJECTION (ML) INTRAVENOUS CONTINUOUS
Status: ACTIVE | OUTPATIENT
Start: 2023-11-18 | End: 2023-11-18

## 2023-11-18 RX ORDER — IBUPROFEN 600 MG/1
600 TABLET ORAL EVERY 6 HOURS PRN
Status: DISCONTINUED | OUTPATIENT
Start: 2023-11-18 | End: 2023-11-20 | Stop reason: HOSPADM

## 2023-11-18 RX ORDER — OXYTOCIN/0.9 % SODIUM CHLORIDE 30/500 ML
125 PLASTIC BAG, INJECTION (ML) INTRAVENOUS CONTINUOUS PRN
Status: DISCONTINUED | OUTPATIENT
Start: 2023-11-18 | End: 2023-11-20 | Stop reason: HOSPADM

## 2023-11-18 RX ADMIN — MORPHINE SULFATE 4 MG: 4 INJECTION, SOLUTION INTRAMUSCULAR; INTRAVENOUS at 04:57

## 2023-11-18 RX ADMIN — Medication 10 ML: at 20:43

## 2023-11-18 RX ADMIN — IBUPROFEN 600 MG: 600 TABLET, FILM COATED ORAL at 20:43

## 2023-11-18 RX ADMIN — SODIUM CHLORIDE, SODIUM LACTATE, POTASSIUM CHLORIDE, AND CALCIUM CHLORIDE 999 ML/HR: 600; 310; 30; 20 INJECTION, SOLUTION INTRAVENOUS at 08:27

## 2023-11-18 RX ADMIN — Medication 10 ML: at 04:58

## 2023-11-18 RX ADMIN — ACETAMINOPHEN 650 MG: 325 TABLET, FILM COATED ORAL at 04:19

## 2023-11-18 RX ADMIN — Medication: at 18:35

## 2023-11-18 RX ADMIN — WITCH HAZEL: 500 SOLUTION RECTAL; TOPICAL at 18:35

## 2023-11-18 RX ADMIN — Medication: at 16:08

## 2023-11-18 RX ADMIN — DOCUSATE SODIUM 100 MG: 100 CAPSULE, LIQUID FILLED ORAL at 20:43

## 2023-11-18 RX ADMIN — Medication 2 MILLI-UNITS/MIN: at 14:31

## 2023-11-18 RX ADMIN — SODIUM CHLORIDE, SODIUM LACTATE, POTASSIUM CHLORIDE, AND CALCIUM CHLORIDE 125 ML/HR: 600; 310; 30; 20 INJECTION, SOLUTION INTRAVENOUS at 09:27

## 2023-11-18 RX ADMIN — Medication 6 ML/HR: at 09:20
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
## 2023-11-18 NOTE — L&D DELIVERY NOTE
[facility]  Vaginal Delivery Note    Diagnosis     Patient is a 18 y.o. female  currently at 38w6d, who presented for IOL secondary to FGR.   Pregnancy c/b: FGR, tobacco use, depression, anxiety         Delivery     Delivery:  Spontaneous Vaginal Delivery    Date of Delivery:  2023 at 1723   Anesthesia:  Epidural   Delivering clinician: Nay Porter MD      Delivery narrative:  Patient presented for IOL, had cervidil to initiate induction. Patient had SROM with clear fluid. She continued to progress along normal labor curve with expectant management. Once completely dilated she pushed for approximately 30 minutes. She delivered a LVFI in a clean field, position EDUARD. Infants head delivered atraumatically, followed by delivery of the rest of the infants body. There were no nuchal cords. Cord was clamped and cut and infant was passed to mothers abdomen. Infant had weak cry, but good color, and tone, and was moving all 4 extremities. Cord blood and gases was obtained and sent for analysis. Placenta was delivered spontaneously and intact with a 3 vessel cord. Placenta was sent to pathology. Pitocin was given IV and fundal massage was applied for hemostasis. The vagina and rectum were examined and there was a right lateral sulcal tear and it was a repaired in a running locked fashion. There was also a first degree laceration and it was repaired in usual fashion. Good hemostasis was noted following delivery and repair.       Infant    Findings: VFI     Apgars: Apgars 9 and 9  Blood gases: venous: pH 7.324; arterial pH 7.17  Infant weight: 5.46 pounds      Placenta, Cord, and Fluid     Delayed cord clamping performed per routine.       Placenta delivered spontaneous, intact  3VC- sent to pathology, very thin umbilical cord      Bimanual massage and Pitocin 30 units in 500 mL bolus given after placental delivery.  There was good hemostasis and a firm uterine tone.        Lacerations       had a 1st degree  laceration, and right sulcal tear, repaired c 3.0 vicryl rapide in the usual fashion.    Quantitiative Blood Loss  283  mL     Sponge and needle counts correct x 2.     Disposition  Mother to Mother Baby/Postpartum  in stable condition currently.  Baby to remains with mom  in stable condition currently.      Nay Porter MD  11/18/23  17:56 EST

## 2023-11-18 NOTE — PLAN OF CARE
Goal Outcome Evaluation:         Pt unable to sleep through the night due to cxts becoming more painful.  Tylenol and one iv dose of morphine given for discomfort.  Pt resting quietly on her right side, no needs voiced.

## 2023-11-18 NOTE — ANESTHESIA PROCEDURE NOTES
Labor Epidural    Pre-sedation assessment completed: 11/18/2023 8:47 AM    Patient reassessed immediately prior to procedure    Patient location during procedure: OB  Start Time: 11/18/2023 8:48 AM  Stop Time: 11/18/2023 9:05 AM  Performed By  Anesthesiologist: Sid Cohen MD  Preanesthetic Checklist  Completed: patient identified, IV checked, site marked, risks and benefits discussed, surgical consent, monitors and equipment checked, pre-op evaluation and timeout performed  Prep:  Pt Position:sitting  Sterile Tech:cap, gloves, gown, mask and sterile barrier  Prep:chlorhexidine gluconate and isopropyl alcohol  Monitoring:blood pressure monitoring and continuous pulse oximetry  Epidural Block Procedure:  Approach:midline  Guidance:palpation technique  Location:L3-L4  Needle Type:Tuohy  Needle Gauge:17 G  Loss of Resistance Medium: air  Loss of Resistance: 8cm  Cath Depth at skin:13 cm  Paresthesia: none  Aspiration:negative  Test Dose:negative  Number of Attempts: 1

## 2023-11-18 NOTE — ANESTHESIA PREPROCEDURE EVALUATION
Anesthesia Evaluation     Patient summary reviewed and Nursing notes reviewed   NPO Solid Status: > 2 hours  NPO Liquid Status: > 2 hours           Airway   Mallampati: III  TM distance: >3 FB  Neck ROM: full  Dental - normal exam     Pulmonary - negative pulmonary ROS   (+) ,decreased breath sounds  Cardiovascular - normal exam        Neuro/Psych  (+) psychiatric history Anxiety, Depression and PTSD  GI/Hepatic/Renal/Endo    (+) obesity, renal disease-    Musculoskeletal (-) negative ROS    Abdominal   (+) obese   Substance History - negative use     OB/GYN    (+) Pregnant        Other - negative ROS                   Anesthesia Plan    ASA 2       Anesthetic plan, risks, benefits, and alternatives have been provided, discussed and informed consent has been obtained with: patient.    Plan discussed with CRNA.    CODE STATUS:    Code Status (Patient has no pulse and is not breathing): CPR (Attempt to Resuscitate)  Medical Interventions (Patient has pulse or is breathing): Full

## 2023-11-19 LAB
ALBUMIN SERPL-MCNC: 2.9 G/DL (ref 3.5–5.2)
ALBUMIN/GLOB SERPL: 1.1 G/DL
ALP SERPL-CCNC: 86 U/L (ref 39–117)
ALT SERPL W P-5'-P-CCNC: 9 U/L (ref 1–33)
ANION GAP SERPL CALCULATED.3IONS-SCNC: 8 MMOL/L (ref 5–15)
AST SERPL-CCNC: 27 U/L (ref 1–32)
BASOPHILS # BLD AUTO: 0 10*3/MM3 (ref 0–0.2)
BASOPHILS NFR BLD AUTO: 0.3 % (ref 0–1.5)
BILIRUB SERPL-MCNC: 0.3 MG/DL (ref 0–1.2)
BUN SERPL-MCNC: 7 MG/DL (ref 6–20)
BUN/CREAT SERPL: 11.7 (ref 7–25)
CALCIUM SPEC-SCNC: 8.5 MG/DL (ref 8.6–10.5)
CHLORIDE SERPL-SCNC: 108 MMOL/L (ref 98–107)
CO2 SERPL-SCNC: 22 MMOL/L (ref 22–29)
CREAT SERPL-MCNC: 0.6 MG/DL (ref 0.57–1)
DEPRECATED RDW RBC AUTO: 42.9 FL (ref 37–54)
EGFRCR SERPLBLD CKD-EPI 2021: 132 ML/MIN/1.73
EOSINOPHIL # BLD AUTO: 0.1 10*3/MM3 (ref 0–0.4)
EOSINOPHIL NFR BLD AUTO: 0.3 % (ref 0.3–6.2)
ERYTHROCYTE [DISTWIDTH] IN BLOOD BY AUTOMATED COUNT: 13.7 % (ref 12.3–15.4)
GLOBULIN UR ELPH-MCNC: 2.7 GM/DL
GLUCOSE SERPL-MCNC: 77 MG/DL (ref 65–99)
HCT VFR BLD AUTO: 32.1 % (ref 34–46.6)
HGB BLD-MCNC: 10.7 G/DL (ref 12–15.9)
LYMPHOCYTES # BLD AUTO: 3 10*3/MM3 (ref 0.7–3.1)
LYMPHOCYTES NFR BLD AUTO: 19.6 % (ref 19.6–45.3)
MCH RBC QN AUTO: 30.2 PG (ref 26.6–33)
MCHC RBC AUTO-ENTMCNC: 33.3 G/DL (ref 31.5–35.7)
MCV RBC AUTO: 90.6 FL (ref 79–97)
MONOCYTES # BLD AUTO: 1 10*3/MM3 (ref 0.1–0.9)
MONOCYTES NFR BLD AUTO: 6.8 % (ref 5–12)
NEUTROPHILS NFR BLD AUTO: 11.1 10*3/MM3 (ref 1.7–7)
NEUTROPHILS NFR BLD AUTO: 73 % (ref 42.7–76)
NRBC BLD AUTO-RTO: 0 /100 WBC (ref 0–0.2)
PLATELET # BLD AUTO: 247 10*3/MM3 (ref 140–450)
PMV BLD AUTO: 8.1 FL (ref 6–12)
POTASSIUM SERPL-SCNC: 4.3 MMOL/L (ref 3.5–5.2)
PROT SERPL-MCNC: 5.6 G/DL (ref 6–8.5)
QT INTERVAL: 333 MS
QTC INTERVAL: 448 MS
RBC # BLD AUTO: 3.55 10*6/MM3 (ref 3.77–5.28)
RPR SER QL: NORMAL
SODIUM SERPL-SCNC: 138 MMOL/L (ref 136–145)
WBC NRBC COR # BLD AUTO: 15.3 10*3/MM3 (ref 3.4–10.8)

## 2023-11-19 PROCEDURE — 25010000002 TETANUS-DIPHTH-ACELL PERTUSSIS 5-2.5-18.5 LF-MCG/0.5 SUSPENSION PREFILLED SYRINGE: Performed by: STUDENT IN AN ORGANIZED HEALTH CARE EDUCATION/TRAINING PROGRAM

## 2023-11-19 PROCEDURE — 99221 1ST HOSP IP/OBS SF/LOW 40: CPT | Performed by: PSYCHIATRY & NEUROLOGY

## 2023-11-19 PROCEDURE — 80053 COMPREHEN METABOLIC PANEL: CPT | Performed by: STUDENT IN AN ORGANIZED HEALTH CARE EDUCATION/TRAINING PROGRAM

## 2023-11-19 PROCEDURE — 90715 TDAP VACCINE 7 YRS/> IM: CPT | Performed by: STUDENT IN AN ORGANIZED HEALTH CARE EDUCATION/TRAINING PROGRAM

## 2023-11-19 PROCEDURE — 85025 COMPLETE CBC W/AUTO DIFF WBC: CPT | Performed by: STUDENT IN AN ORGANIZED HEALTH CARE EDUCATION/TRAINING PROGRAM

## 2023-11-19 PROCEDURE — 90471 IMMUNIZATION ADMIN: CPT | Performed by: STUDENT IN AN ORGANIZED HEALTH CARE EDUCATION/TRAINING PROGRAM

## 2023-11-19 RX ADMIN — Medication 10 ML: at 03:30

## 2023-11-19 RX ADMIN — WITCH HAZEL: 500 SOLUTION RECTAL; TOPICAL at 11:15

## 2023-11-19 RX ADMIN — DOCUSATE SODIUM 100 MG: 100 CAPSULE, LIQUID FILLED ORAL at 09:48

## 2023-11-19 RX ADMIN — TETANUS TOXOID, REDUCED DIPHTHERIA TOXOID AND ACELLULAR PERTUSSIS VACCINE, ADSORBED 0.5 ML: 5; 2.5; 8; 8; 2.5 SUSPENSION INTRAMUSCULAR at 10:05

## 2023-11-19 RX ADMIN — PRENATAL VITAMINS-IRON FUMARATE 27 MG IRON-FOLIC ACID 0.8 MG TABLET 1 TABLET: at 09:48

## 2023-11-19 RX ADMIN — DOCUSATE SODIUM 100 MG: 100 CAPSULE, LIQUID FILLED ORAL at 22:35

## 2023-11-19 NOTE — LACTATION NOTE
This note was copied from a baby's chart.  Provided mother with handouts, nipple cream and gel pads, instructed on use. Basic teaching done. Denies history of breast surgery. Denies use of routine medications. Denies wool allergy. Has a Zomee breastpump at home. Uses [facility]. States that she just finished feeding the baby. Encouraged to call for feeding observation.
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
## 2023-11-19 NOTE — PROGRESS NOTES
[facility]  Postpartum Note    Subjective   Postpartum Day 1:  Spontaneous Vaginal Delivery    Patient without complaints. Her pain is well controlled with prescribed pain medications. She is ambulating and voiding well.  Bleeding is appropriate for pp period.    Patient reports panic attack last night, she said she had more anxiety around delivery and after and reports it has improved now. She believes this attributed to heart rate. She denies palpitations, chest pain, or dyspnea. She is not interested in seeing psychiatry today.     Objective     Vitals:  Vitals:    11/18/23 2014 11/18/23 2051 11/18/23 2310 11/19/23 0300   BP: 136/72  126/89 131/73   BP Location: Right arm  Right arm Right arm   Patient Position: Sitting  Sitting Sitting   Pulse: (!) 125 117 107 81   Resp: 18 17 17   Temp: 98.3 °F (36.8 °C)  97.8 °F (36.6 °C) 97.7 °F (36.5 °C)   TempSrc: Oral  Oral Oral   SpO2: 97%  96% 96%   Weight:       Height:           Physical Exam:  General:  no acute distress.  CV: RRR  Resp: non-labored respirations, clear to auscultation bilaterally    Abdomen: Fundus firm below umbilicus, nontender  Extremities: moves all extremities well, no cyanosis or erythema, No edema      Labs:  Results from last 7 days   Lab Units 11/19/23  0522 11/17/23 2022   WBC 10*3/mm3 15.30* 12.00*   HEMOGLOBIN g/dL 10.7* 13.0   HEMATOCRIT % 32.1* 38.4   PLATELETS 10*3/mm3 247 280     Results from last 7 days   Lab Units 11/19/23  0522   GLUCOSE mg/dL 77          Feeding method: Breastfeeding Status: Yes     Blood Type: RH Positive        Assessment & Plan     Principal Problem:    Encounter for induction of labor  Active Problems:    Vaginal delivery    Fetal growth restriction antepartum      Trupti Rae is Day 1  post-partum from a  Spontaneous Vaginal Delivery      Plan:    Meeting postpartum goals.   Rhogam not indicated, Rh positive.   Anemia, asymptomatic, continue PNV. .   Voiding spontaneously.   Contraception: declines and  wants to continue to discuss at PP visit.   Tachycardic overnight, pt asymptomatic, EKG sinus tachycardia with T wave abnormalities, CMP with normal K this AM, pt not having sx continue to monitor. Normal heart rate this AM. Improved with fluids, could be d/t dehydration or anxiety.     Postpartum Depression: High Risk (2023)    [place]  Depression Scale     Last EPDS Total Score: 28     Last EPDS Self Harm Result: Not on file     EPDS elevated, known history of anxiety and depression, history of suicide attempt.   Social work and psychiatry consult pending.     Plan for discharge in AM.         Nay Porter MD  2023  07:11 EST

## 2023-11-19 NOTE — PLAN OF CARE
Goal Outcome Evaluation:  Plan of Care Reviewed With: patient, spouse        Progress: improving  Outcome Evaluation: Mother and father bonding well with infant, rooming-in with infant and breastfeeding.  Pain controlled with PRN medication.  Voiding without difficulty and resting between care. Active in parenting roles and speaks positively about infant.

## 2023-11-19 NOTE — PLAN OF CARE
Goal Outcome Evaluation:  Plan of Care Reviewed With: patient, spouse pt is voiding and passing gas. She is ambulating room. I have offered pain medication pt has refused stating she is comfortable. She is breastfeeding infant, pumping and hand expressing. Will continue to monitor,                      regular pulse, no edema

## 2023-11-19 NOTE — NURSING NOTE
Patient called out while infant was crying very overwhelmed.  Patient appeared to be hyperventilating and was extremely worried abut the infant possibly chocking in her sleep and didn't know why she was crying and couldn't understand. Education and reassurance was provided. Patient stated she could not sleep because the fear of something happening to infant. Patient mood shifted quickly since RN last in room. EPDS was 28 and it appears patient is having trouble regulating emotions at this time. Lactation notified to help infant latch and work with patient because breastfeeding/latching infant was overwhelming patient at this time.

## 2023-11-20 VITALS
OXYGEN SATURATION: 96 % | DIASTOLIC BLOOD PRESSURE: 86 MMHG | RESPIRATION RATE: 15 BRPM | TEMPERATURE: 98.1 F | HEART RATE: 85 BPM | SYSTOLIC BLOOD PRESSURE: 121 MMHG | WEIGHT: 243.83 LBS | HEIGHT: 67 IN | BODY MASS INDEX: 38.27 KG/M2

## 2023-11-20 PROBLEM — Z34.90 ENCOUNTER FOR INDUCTION OF LABOR: Status: RESOLVED | Noted: 2023-11-17 | Resolved: 2023-11-20

## 2023-11-20 PROBLEM — O36.5990 FETAL GROWTH RESTRICTION ANTEPARTUM: Status: RESOLVED | Noted: 2023-11-18 | Resolved: 2023-11-20

## 2023-11-20 RX ORDER — IBUPROFEN 600 MG/1
600 TABLET ORAL EVERY 6 HOURS PRN
Qty: 30 TABLET | Refills: 1 | Status: SHIPPED | OUTPATIENT
Start: 2023-11-20

## 2023-11-20 RX ORDER — HYDROXYZINE HYDROCHLORIDE 25 MG/1
50 TABLET, FILM COATED ORAL 3 TIMES DAILY PRN
Qty: 30 TABLET | Refills: 6 | Status: SHIPPED | OUTPATIENT
Start: 2023-11-20

## 2023-11-20 RX ADMIN — DOCUSATE SODIUM 100 MG: 100 CAPSULE, LIQUID FILLED ORAL at 08:25

## 2023-11-20 RX ADMIN — PRENATAL VITAMINS-IRON FUMARATE 27 MG IRON-FOLIC ACID 0.8 MG TABLET 1 TABLET: at 08:25

## 2023-11-20 RX ADMIN — IBUPROFEN 600 MG: 600 TABLET, FILM COATED ORAL at 09:50

## 2023-11-20 NOTE — DISCHARGE SUMMARY
[facility]  Delivery Discharge Summary    Primary OB Clinician: Nay Porter MD    Admission Diagnosis:  Active Problems:    * No active hospital problems. *  38w6d IUP  IOL  Fetal growth restriction    Discharge Diagnosis:  Same, delivered    Gestational Age: 38w6d    Date of Delivery: 2023     Delivered By:  Nay Porter     Delivery Type: Vaginal, Spontaneous      Tubal Ligation: n/a    Intrapartum Course: Uncomplicated delivery.     Postpartum Course:  Pt was admitted and underwent  Spontaneous Vaginal Delivery. Pt was transferred to PP where she had an uncomplicated course. Pt remained AFVSS, had scant lochia and pain was well controlled. Pt d/c home in stable condition and will f/u in office for PP visit as scheduled or PRN. Currently breastfeeding. Plans on OCP  for contraception. Pt has hx of depression and anxiety. Pt feeling overwhelmed and having panic attacks. Denies BETHANY/HI. Psych consulted and saw pt yesterday. Pt interested in Hydroxyzine. States Zoloft made her feel like a zombie. Rec f/u in 1-2 weeks and /c counseling.     Physical Exam:    Vitals:   Vitals:    23 1108 23 1500 23 2344 23 0715   BP: 107/59 115/83 138/83 121/86   BP Location: Right arm Left arm Left arm Left arm   Patient Position: Sitting Sitting Sitting Lying   Pulse: 88 97 96 85   Resp: 16 16 14 15   Temp: 98.4 °F (36.9 °C) 98 °F (36.7 °C) 98.8 °F (37.1 °C) 98.1 °F (36.7 °C)   TempSrc: Oral Oral Oral Oral   SpO2: 97% 97% 97% 96%   Weight:   111 kg (243 lb 13.3 oz)    Height:         Temp (24hrs), Av.3 °F (36.8 °C), Min:98 °F (36.7 °C), Max:98.8 °F (37.1 °C)      General Appearance:    Alert, cooperative, in no acute distress   Abdomen:     Soft non-tender, non-distended, no guarding, no rebound         tenderness.   Extremities:   Moves all extremities well, no edema, no cyanosis, no              Redness.   Incision:   N/a   Fundus:   Firm, below umbilicus     Feeding method: Breastfeeding Status:  Yes    Labs:  Results from last 7 days   Lab Units 11/19/23  0522 11/17/23 2022   WBC 10*3/mm3 15.30* 12.00*   HEMOGLOBIN g/dL 10.7* 13.0   HEMATOCRIT % 32.1* 38.4   PLATELETS 10*3/mm3 247 280     Results from last 7 days   Lab Units 11/19/23  0522   GLUCOSE mg/dL 77       Blood Type: RH Positive      Plan:  Discharge to home.    Rx Hydroxyzine 25-50mg qid prn for anxiety.  Follow-up appointment with Dr Porter in 2 weeks and in 6 weeks.    Catherine Titus, APRN  11/20/2023  10:30 EST      /d

## 2023-11-20 NOTE — PLAN OF CARE
Goal Outcome Evaluation:      Pt has rested for a couple hours tonight. She is bonding well with baby and worked hard with getting baby to breast feed and to pump. Pain is controlled well. She showered and ambulated in the room. Her fundus is firm and bleeding is scant.     Progress: improving

## 2023-11-20 NOTE — SIGNIFICANT NOTE
Case Management Discharge Note                Selected Continued Care - Discharged on 11/20/2023 Admission date: 11/17/2023 - Discharge disposition: Home or Self Care      Destination    No services have been selected for the patient.                Durable Medical Equipment    No services have been selected for the patient.                Dialysis/Infusion    No services have been selected for the patient.                Home Medical Care    No services have been selected for the patient.                Therapy    No services have been selected for the patient.                Community Resources    No services have been selected for the patient.                Community & DME    No services have been selected for the patient.                    Selected Continued Care - Episodes Includes continued care and service providers with selected services from the active episodes listed below      [facility] Episode start date: 6/1/2023   There are no active outsourced providers for this episode.                 Transportation Services  Private: Car    Final Discharge Disposition Code: (P) 01 - home or self-care

## 2023-11-20 NOTE — LACTATION NOTE
This note was copied from a baby's chart.  Instructed on gentle stimulation to breast, then manual expression. Expressed milk and dropped into baby's mouth. Baby would latch, not suck. Provided with nipple shield, instructed on temporary use and cleaning. Baby latched and fed for 30mins. With audible swallowing. Mother reported uterine cramping as feeding progressed. Praised efforts. Encouraged to call as needed.

## 2023-11-20 NOTE — CASE MANAGEMENT/SOCIAL WORK
Social Work Assessment  [facility]     Patient Name: Trupti Rae  MRN: [id number]  Today's Date: 2023    Admit Date: 2023     Discharge Plan       Row Name 23 1251       Plan    Plan Routine home with parents and FOB at d/c.    Patient/Family in Agreement with Plan yes    Plan Comments Consulted for PPD (elevated EPDS score on screen). Met with patient and FOB at bedside. Introduced self/reason for consult. Congratulated on birth of . At time of visit, patient tending to infant needs. Supportive resources provided for PPD & reviewed. At present time, patient reports they have made the decision to start hydroxyzine for PP needs. Patient plans to self refer to [facility] ([facility] counseling) as she had a provider with them previously and has a small copay through insurance. Plan for 2-week f/u with OBGYN for PP and standard 6-week f/u. RN updated. Confirmed no additional questions.  Confirmed patient having necessary items for infant at home and identified positive support system (lives with FOB & parents) to help assist as needed. LSW to follow as needed, however patient excited for discharge home. Contact information provided for any post-hospital needs.                  JOSEPH Tate, MSSW, CCM    Phone: [phone]  Cell: [phone]  Fax: [phone]  [email or web address]

## 2023-11-27 LAB
LAB AP CASE REPORT: NORMAL
LAB AP DIAGNOSIS COMMENT: NORMAL
PATH REPORT.FINAL DX SPEC: NORMAL
PATH REPORT.GROSS SPEC: NORMAL

## 2023-11-28 ENCOUNTER — PATIENT OUTREACH (OUTPATIENT)
Dept: LABOR AND DELIVERY | Facility: HOSPITAL | Age: 18
End: 2023-11-28
Payer: COMMERCIAL

## 2024-03-05 ENCOUNTER — OFFICE VISIT (OUTPATIENT)
Dept: FAMILY MEDICINE CLINIC | Facility: CLINIC | Age: 19
End: 2024-03-05
Payer: COMMERCIAL

## 2024-03-05 VITALS
SYSTOLIC BLOOD PRESSURE: 116 MMHG | HEIGHT: 67 IN | DIASTOLIC BLOOD PRESSURE: 76 MMHG | HEART RATE: 87 BPM | WEIGHT: 235.2 LBS | OXYGEN SATURATION: 98 % | BODY MASS INDEX: 36.91 KG/M2 | TEMPERATURE: 97.8 F

## 2024-03-05 DIAGNOSIS — E66.01 MORBID (SEVERE) OBESITY DUE TO EXCESS CALORIES: ICD-10-CM

## 2024-03-05 DIAGNOSIS — R41.840 CONCENTRATION DEFICIT: ICD-10-CM

## 2024-03-05 DIAGNOSIS — Z00.00 WELLNESS EXAMINATION: Primary | ICD-10-CM

## 2024-03-05 RX ORDER — ESCITALOPRAM OXALATE 10 MG/1
1 TABLET ORAL DAILY
COMMUNITY
Start: 2024-02-12 | End: 2024-03-05

## 2024-03-05 RX ORDER — NORELGESTROMIN AND ETHINYL ESTRADIOL 35; 150 UG/MG; UG/MG
1 PATCH TRANSDERMAL WEEKLY
Qty: 9 PATCH | Refills: 12 | Status: SHIPPED | OUTPATIENT
Start: 2024-03-05

## 2024-03-05 RX ORDER — ESCITALOPRAM OXALATE 20 MG/1
20 TABLET ORAL DAILY
Qty: 90 TABLET | Refills: 1 | Status: SHIPPED | OUTPATIENT
Start: 2024-03-05

## 2024-03-05 NOTE — PROGRESS NOTES
"Subjective   Trupti Rae is a 19 y.o. female and is here for a comprehensive physical exam. The patient reports problems - She gave birth in November and is interested in resuming birth control.  She also wants to increase the dose of her Lexapro for depression and she wants to be tested for ADD.  She also wants to try a weight-loss medicine.  .    Do you take any herbs or supplements that were not prescribed by a doctor? no  Are you taking calcium supplements? no  Are you taking aspirin daily? no    The following portions of the patient's history were reviewed and updated as appropriate: allergies, current medications, past family history, past medical history, past social history, past surgical history, and problem list.    Review of Systems  Do you have pain that bothers you in your daily life? not asked  Pertinent items are noted in HPI.    Objective   /76 (BP Location: Right arm, Patient Position: Sitting, Cuff Size: Large Adult)   Pulse 87   Temp 97.8 °F (36.6 °C) (Infrared)   Ht 170.2 cm (67\")   Wt 107 kg (235 lb 3.2 oz)   LMP 02/11/2024 (Approximate)   SpO2 98%   Breastfeeding No   BMI 36.84 kg/m²   General appearance: alert, appears stated age, and cooperative  Head: Normocephalic, without obvious abnormality, atraumatic  Neck: no adenopathy, no JVD, supple, symmetrical, trachea midline, and thyroid not enlarged, symmetric, no tenderness/mass/nodules  Lungs: clear to auscultation bilaterally  Heart: regular rate and rhythm, S1, S2 normal, no murmur, click, rub or gallop  Extremities: extremities normal, atraumatic, no cyanosis or edema  Skin: Skin color, texture, turgor normal. No rashes or lesions  Lymph nodes: Cervical, supraclavicular, and axillary nodes normal.  Neurologic: Grossly normal     No visits with results within 1 Week(s) from this visit.   Latest known visit with results is:   Admission on 11/17/2023, Discharged on 11/20/2023   Component Date Value Ref Range Status    ABO " Type 11/17/2023 A   Final    RH type 11/17/2023 Positive   Final    Antibody Screen 11/17/2023 Negative   Final    T&S Expiration Date 11/17/2023 11/20/2023 11:59:59 PM   Final    RPR 11/17/2023 Non-Reactive  Non-Reactive Final    WBC 11/17/2023 12.00 (H)  3.40 - 10.80 10*3/mm3 Final    RBC 11/17/2023 4.31  3.77 - 5.28 10*6/mm3 Final    Hemoglobin 11/17/2023 13.0  12.0 - 15.9 g/dL Final    Hematocrit 11/17/2023 38.4  34.0 - 46.6 % Final    MCV 11/17/2023 89.2  79.0 - 97.0 fL Final    MCH 11/17/2023 30.3  26.6 - 33.0 pg Final    MCHC 11/17/2023 33.9  31.5 - 35.7 g/dL Final    RDW 11/17/2023 13.6  12.3 - 15.4 % Final    RDW-SD 11/17/2023 42.0  37.0 - 54.0 fl Final    MPV 11/17/2023 8.2  6.0 - 12.0 fL Final    Platelets 11/17/2023 280  140 - 450 10*3/mm3 Final    Case Report 11/18/2023    Final                    Value:Surgical Pathology Report                         Case: [id number]                                  Authorizing Provider:  Nay Porter MD         Collected:           11/18/2023 05:52 PM          Ordering Location:     [facility] LABOR Received:            11/20/2023 08:18 AM                                 AND DELIVERY                                                                 Pathologist:           Jairo Antonio MD                                                            Specimen:    Placenta                                                                                   Final Diagnosis 11/18/2023    Final                    Value:This result contains rich text formatting which cannot be displayed here.    Comment 11/18/2023    Final                    Value:This result contains rich text formatting which cannot be displayed here.    Gross Description 11/18/2023    Final                    Value:This result contains rich text formatting which cannot be displayed here.    QT Interval 11/18/2023 333  ms Final    QTC Interval 11/18/2023 448  ms Final    Glucose 11/19/2023 77  65 -  99 mg/dL Final    BUN 11/19/2023 7  6 - 20 mg/dL Final    Creatinine 11/19/2023 0.60  0.57 - 1.00 mg/dL Final    Sodium 11/19/2023 138  136 - 145 mmol/L Final    Potassium 11/19/2023 4.3  3.5 - 5.2 mmol/L Final    Chloride 11/19/2023 108 (H)  98 - 107 mmol/L Final    CO2 11/19/2023 22.0  22.0 - 29.0 mmol/L Final    Calcium 11/19/2023 8.5 (L)  8.6 - 10.5 mg/dL Final    Total Protein 11/19/2023 5.6 (L)  6.0 - 8.5 g/dL Final    Albumin 11/19/2023 2.9 (L)  3.5 - 5.2 g/dL Final    ALT (SGPT) 11/19/2023 9  1 - 33 U/L Final    AST (SGOT) 11/19/2023 27  1 - 32 U/L Final    Alkaline Phosphatase 11/19/2023 86  39 - 117 U/L Final    Total Bilirubin 11/19/2023 0.3  0.0 - 1.2 mg/dL Final    Globulin 11/19/2023 2.7  gm/dL Final    A/G Ratio 11/19/2023 1.1  g/dL Final    BUN/Creatinine Ratio 11/19/2023 11.7  7.0 - 25.0 Final    Anion Gap 11/19/2023 8.0  5.0 - 15.0 mmol/L Final    eGFR 11/19/2023 132.0  >60.0 mL/min/1.73 Final    WBC 11/19/2023 15.30 (H)  3.40 - 10.80 10*3/mm3 Final    RBC 11/19/2023 3.55 (L)  3.77 - 5.28 10*6/mm3 Final    Hemoglobin 11/19/2023 10.7 (L)  12.0 - 15.9 g/dL Final    Hematocrit 11/19/2023 32.1 (L)  34.0 - 46.6 % Final    MCV 11/19/2023 90.6  79.0 - 97.0 fL Final    MCH 11/19/2023 30.2  26.6 - 33.0 pg Final    MCHC 11/19/2023 33.3  31.5 - 35.7 g/dL Final    RDW 11/19/2023 13.7  12.3 - 15.4 % Final    RDW-SD 11/19/2023 42.9  37.0 - 54.0 fl Final    MPV 11/19/2023 8.1  6.0 - 12.0 fL Final    Platelets 11/19/2023 247  140 - 450 10*3/mm3 Final    Neutrophil % 11/19/2023 73.0  42.7 - 76.0 % Final    Lymphocyte % 11/19/2023 19.6  19.6 - 45.3 % Final    Monocyte % 11/19/2023 6.8  5.0 - 12.0 % Final    Eosinophil % 11/19/2023 0.3  0.3 - 6.2 % Final    Basophil % 11/19/2023 0.3  0.0 - 1.5 % Final    Neutrophils, Absolute 11/19/2023 11.10 (H)  1.70 - 7.00 10*3/mm3 Final    Lymphocytes, Absolute 11/19/2023 3.00  0.70 - 3.10 10*3/mm3 Final    Monocytes, Absolute 11/19/2023 1.00 (H)  0.10 - 0.90 10*3/mm3 Final     Eosinophils, Absolute 11/19/2023 0.10  0.00 - 0.40 10*3/mm3 Final    Basophils, Absolute 11/19/2023 0.00  0.00 - 0.20 10*3/mm3 Final    nRBC 11/19/2023 0.0  0.0 - 0.2 /100 WBC Final       Assessment & Plan   Healthy female exam.   Diagnoses and all orders for this visit:    1. Wellness examination (Primary)  -     norelgestromin-ethinyl estradiol (ORTHO EVRA) 150-35 MCG/24HR; Place 1 patch on the skin as directed by provider 1 (One) Time Per Week.  Dispense: 9 patch; Refill: 12    2. Concentration deficit  -     Ambulatory Referral to Psychiatry    3. Morbid (severe) obesity due to excess calories  -     naltrexone-bupropion ER (CONTRAVE) 8-90 MG tablet; Wk 1: 1 tab daily, Wk 2: 1 tab twice a day, Wk 3: 2 tabs in AM, 1 tab in PM, Wk 4: 2 tabs twice a day, Maintenance dose: 2 tabs twice daily.  Dispense: 120 tablet; Refill: 1    Other orders  -     escitalopram (Lexapro) 20 MG tablet; Take 1 tablet by mouth Daily.  Dispense: 90 tablet; Refill: 1      1. Well exam.   2. Patient Counseling:  --Nutrition: Stressed importance of moderation in sodium/caffeine intake, saturated fat and cholesterol, caloric balance, sufficient intake of fresh fruits, vegetables, fiber, calcium, iron, and 1 mg of folate supplement per day (for females capable of pregnancy).  --Exercise: Stressed the importance of regular exercise.   --Dental health: Discussed importance of regular tooth brushing, flossing, and dental visits.  --Immunizations reviewed.    3. Discussed the patient's BMI with her.  The BMI is above average; BMI management plan is completed  4. Follow up in one year

## 2024-03-06 ENCOUNTER — TELEPHONE (OUTPATIENT)
Dept: FAMILY MEDICINE CLINIC | Facility: CLINIC | Age: 19
End: 2024-03-06
Payer: COMMERCIAL

## 2024-03-06 NOTE — TELEPHONE ENCOUNTER
HUB TO RELAY    CALLED PATIENT AND ADVISED THAT SHE HAS A COUPLE OPTIONS. SHE CAN GO TO [facility] OR [facility].  Name: [facility]   Address: [address], [place] [address]   Phone Number: [phone]   Fax Number: [phone]   Notes: ADD/ADHD testing  [place] location: [address] [place] [address]     Name: [facility] Garza   Address: [address], [place] [address]   Phone Number: [phone]   Fax Number: [phone]   Notes: ADD/ADHD testing

## 2024-04-19 ENCOUNTER — OFFICE VISIT (OUTPATIENT)
Dept: PSYCHIATRY | Facility: CLINIC | Age: 19
End: 2024-04-19
Payer: COMMERCIAL

## 2024-04-19 DIAGNOSIS — F43.10 PTSD (POST-TRAUMATIC STRESS DISORDER): Chronic | ICD-10-CM

## 2024-04-19 DIAGNOSIS — F41.9 SEVERE ANXIETY: Chronic | ICD-10-CM

## 2024-04-19 DIAGNOSIS — F33.2 MAJOR DEPRESSIVE DISORDER, RECURRENT SEVERE WITHOUT PSYCHOTIC FEATURES: Primary | Chronic | ICD-10-CM
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
## 2024-04-29 ENCOUNTER — OFFICE VISIT (OUTPATIENT)
Dept: FAMILY MEDICINE CLINIC | Facility: CLINIC | Age: 19
End: 2024-04-29
Payer: COMMERCIAL

## 2024-04-29 VITALS
WEIGHT: 254.3 LBS | OXYGEN SATURATION: 94 % | HEIGHT: 67 IN | HEART RATE: 103 BPM | TEMPERATURE: 97.7 F | SYSTOLIC BLOOD PRESSURE: 106 MMHG | DIASTOLIC BLOOD PRESSURE: 60 MMHG | BODY MASS INDEX: 39.91 KG/M2

## 2024-04-29 DIAGNOSIS — Z78.9 USES DEPO-PROVERA AS PRIMARY BIRTH CONTROL METHOD: Primary | ICD-10-CM

## 2024-04-29 DIAGNOSIS — F33.2 MAJOR DEPRESSIVE DISORDER, RECURRENT SEVERE WITHOUT PSYCHOTIC FEATURES: ICD-10-CM

## 2024-04-29 PROCEDURE — 99214 OFFICE O/P EST MOD 30 MIN: CPT | Performed by: FAMILY MEDICINE

## 2024-04-29 PROCEDURE — 96372 THER/PROPH/DIAG INJ SC/IM: CPT | Performed by: FAMILY MEDICINE

## 2024-04-29 RX ORDER — MEDROXYPROGESTERONE ACETATE 150 MG/ML
150 INJECTION, SUSPENSION INTRAMUSCULAR ONCE
Status: COMPLETED | OUTPATIENT
Start: 2024-04-29 | End: 2024-04-29

## 2024-04-29 RX ORDER — SERTRALINE HYDROCHLORIDE 25 MG/1
25 TABLET, FILM COATED ORAL DAILY
Qty: 30 TABLET | Refills: 0 | Status: SHIPPED | OUTPATIENT
Start: 2024-04-29

## 2024-04-29 RX ADMIN — MEDROXYPROGESTERONE ACETATE 150 MG: 150 INJECTION, SUSPENSION INTRAMUSCULAR at 15:53

## 2024-04-29 NOTE — PROGRESS NOTES
"Chief Complaint  Contraception (Would like to discuss her options )    Subjective        Trupti Rae presents to [facility]  History of Present Illness  She is currently using a birth control patch but is having some problems remembering to use it every week.  She wants to consider a vaginal ring or Depo Provera injection.  She gave birth to a daughter a few months ago.   Contraception  Pertinent negatives include no abdominal pain, anorexia, arthralgias, change in bowel habit, chest pain, congestion, coughing or fever.   Depression  Presents for follow-up visit. Symptoms include depressed mood, insomnia and nervousness/anxiety. Patient is not experiencing: suicidal ideas, suicidal planning and chest pain.Symptoms occur most days.  The severity of symptoms is moderate.  The quality of sleep is fair. Her past medical history is significant for depression. Treatments tried: Trintellix was too expensive. Prior compliance problems include insurance issues.       Objective   Vital Signs:  /60 (BP Location: Left arm, Patient Position: Sitting, Cuff Size: Large Adult)   Pulse 103   Temp 97.7 °F (36.5 °C) (Infrared)   Ht 170.2 cm (67\")   Wt 115 kg (254 lb 4.8 oz)   SpO2 94%   BMI 39.83 kg/m²   Estimated body mass index is 39.83 kg/m² as calculated from the following:    Height as of this encounter: 170.2 cm (67\").    Weight as of this encounter: 115 kg (254 lb 4.8 oz).               Physical Exam  Vitals and nursing note reviewed.   Constitutional:       General: She is not in acute distress.     Appearance: She is well-developed.   HENT:      Head: Normocephalic.   Eyes:      General: Lids are normal.      Conjunctiva/sclera: Conjunctivae normal.   Neck:      Thyroid: No thyroid mass or thyromegaly.      Trachea: Trachea normal.   Cardiovascular:      Rate and Rhythm: Normal rate and regular rhythm.      Heart sounds: Normal heart sounds.   Pulmonary:      Effort: Pulmonary " effort is normal.      Breath sounds: Normal breath sounds.   Musculoskeletal:      Cervical back: Normal range of motion.   Lymphadenopathy:      Cervical: No cervical adenopathy.   Skin:     General: Skin is warm and dry.   Neurological:      Mental Status: She is alert and oriented to person, place, and time.   Psychiatric:         Attention and Perception: She is attentive.         Mood and Affect: Mood normal.         Speech: Speech normal.         Behavior: Behavior normal.        Result Review :    The following data was reviewed by: Barb Willingham MD on 04/29/2024:  Common labs          11/17/2023    20:22 11/19/2023    05:22   Common Labs   Glucose  77    BUN  7    Creatinine  0.60    Sodium  138    Potassium  4.3    Chloride  108    Calcium  8.5    Albumin  2.9    Total Bilirubin  0.3    Alkaline Phosphatase  86    AST (SGOT)  27    ALT (SGPT)  9    WBC 12.00  15.30    Hemoglobin 13.0  10.7    Hematocrit 38.4  32.1    Platelets 280  247                   Assessment and Plan     Diagnoses and all orders for this visit:    1. Uses Depo-Provera as primary birth control method (Primary)  -     medroxyPROGESTERone (DEPO-PROVERA) injection 150 mg    2. Major depressive disorder, recurrent severe without psychotic features  Assessment & Plan:  Patient's depression is a recurrent episode that is moderate without psychosis. Depression is active and worsening.    Plan:   Begin new antidepressant medicine; Zoloft    Followup in 3 months.     Orders:  -     sertraline (Zoloft) 25 MG tablet; Take 1 tablet by mouth Daily.  Dispense: 30 tablet; Refill: 0             Follow Up     No follow-ups on file.  Patient was given instructions and counseling regarding her condition or for health maintenance advice. Please see specific information pulled into the AVS if appropriate.

## 2024-04-29 NOTE — ASSESSMENT & PLAN NOTE
Patient's depression is a recurrent episode that is moderate without psychosis. Depression is active and worsening.    Plan:   Begin new antidepressant medicine; Zoloft    Followup in 3 months.

## 2024-05-06 ENCOUNTER — OFFICE VISIT (OUTPATIENT)
Dept: FAMILY MEDICINE CLINIC | Facility: CLINIC | Age: 19
End: 2024-05-06
Payer: COMMERCIAL

## 2024-05-06 ENCOUNTER — HOSPITAL ENCOUNTER (OUTPATIENT)
Dept: GENERAL RADIOLOGY | Facility: HOSPITAL | Age: 19
Discharge: HOME | End: 2024-05-06
Admitting: FAMILY MEDICINE
Payer: COMMERCIAL

## 2024-05-06 VITALS
WEIGHT: 256.4 LBS | BODY MASS INDEX: 40.24 KG/M2 | DIASTOLIC BLOOD PRESSURE: 72 MMHG | OXYGEN SATURATION: 98 % | TEMPERATURE: 98.6 F | SYSTOLIC BLOOD PRESSURE: 114 MMHG | HEIGHT: 67 IN | HEART RATE: 90 BPM

## 2024-05-06 DIAGNOSIS — J06.9 UPPER RESPIRATORY TRACT INFECTION, UNSPECIFIED TYPE: ICD-10-CM

## 2024-05-06 DIAGNOSIS — M79.672 LEFT FOOT PAIN: ICD-10-CM

## 2024-05-06 DIAGNOSIS — M79.672 LEFT FOOT PAIN: Primary | ICD-10-CM

## 2024-05-06 PROCEDURE — 73630 X-RAY EXAM OF FOOT: CPT

## 2024-05-06 PROCEDURE — 99213 OFFICE O/P EST LOW 20 MIN: CPT | Performed by: FAMILY MEDICINE

## 2024-05-06 RX ORDER — AMOXICILLIN 500 MG/1
500 CAPSULE ORAL 3 TIMES DAILY
Qty: 30 CAPSULE | Refills: 0 | Status: SHIPPED | OUTPATIENT
Start: 2024-05-06

## 2024-05-07 ENCOUNTER — PATIENT ROUNDING (BHMG ONLY) (OUTPATIENT)
Dept: FAMILY MEDICINE CLINIC | Facility: CLINIC | Age: 19
End: 2024-05-07
Payer: COMMERCIAL

## 2024-05-07 ENCOUNTER — TELEPHONE (OUTPATIENT)
Dept: FAMILY MEDICINE CLINIC | Facility: CLINIC | Age: 19
End: 2024-05-07
Payer: COMMERCIAL

## 2024-05-10 DIAGNOSIS — M79.672 LEFT FOOT PAIN: Primary | ICD-10-CM

## 2024-05-13 ENCOUNTER — OFFICE VISIT (OUTPATIENT)
Dept: FAMILY MEDICINE CLINIC | Facility: CLINIC | Age: 19
End: 2024-05-13
Payer: COMMERCIAL

## 2024-05-13 VITALS
BODY MASS INDEX: 38.33 KG/M2 | DIASTOLIC BLOOD PRESSURE: 72 MMHG | SYSTOLIC BLOOD PRESSURE: 108 MMHG | RESPIRATION RATE: 18 BRPM | HEART RATE: 90 BPM | WEIGHT: 244.2 LBS | TEMPERATURE: 98.9 F | OXYGEN SATURATION: 96 % | HEIGHT: 67 IN

## 2024-05-13 DIAGNOSIS — R32 URINARY INCONTINENCE, UNSPECIFIED TYPE: Primary | ICD-10-CM

## 2024-05-13 LAB
B-HCG UR QL: NEGATIVE
BILIRUB BLD-MCNC: NEGATIVE MG/DL
CLARITY, POC: CLEAR
COLOR UR: YELLOW
EXPIRATION DATE: NORMAL
EXPIRATION DATE: NORMAL
GLUCOSE UR STRIP-MCNC: NEGATIVE MG/DL
INTERNAL NEGATIVE CONTROL: NEGATIVE
INTERNAL POSITIVE CONTROL: POSITIVE
KETONES UR QL: NEGATIVE
LEUKOCYTE EST, POC: NEGATIVE
Lab: 1231
Lab: NORMAL
NITRITE UR-MCNC: NEGATIVE MG/ML
PH UR: 6.5 [PH] (ref 5–8)
PROT UR STRIP-MCNC: NEGATIVE MG/DL
RBC # UR STRIP: NEGATIVE /UL
SP GR UR: 1.02 (ref 1–1.03)
UROBILINOGEN UR QL: NORMAL

## 2024-05-13 PROCEDURE — 81025 URINE PREGNANCY TEST: CPT | Performed by: FAMILY MEDICINE

## 2024-05-13 PROCEDURE — 81003 URINALYSIS AUTO W/O SCOPE: CPT | Performed by: FAMILY MEDICINE

## 2024-05-13 PROCEDURE — 99213 OFFICE O/P EST LOW 20 MIN: CPT | Performed by: FAMILY MEDICINE

## 2024-05-13 RX ORDER — MEDROXYPROGESTERONE ACETATE 150 MG/ML
150 INJECTION, SUSPENSION INTRAMUSCULAR
COMMUNITY

## 2024-05-13 NOTE — PROGRESS NOTES
Subjective   Chief Complaint   Patient presents with    Urinary Tract Infection     Incontinence 24     Trupti Rae is a 19 y.o. female.     Patient Care Team:  Barb Willingham MD as PCP - General (Family Medicine)    History of Present Illness  She is coming in today due to urinary concerns.  Patient reports that yesterday while holding her baby she had some incontinence.  It has not happened since then.  She tells me that in the past she only had this happen when she was pregnant and had urinary tract infection.  She denies any urgency, frequency, or discomfort with urination.  No blood in urine.  She reports that her last menstrual period was on 2024.       The following portions of the patient's history were reviewed and updated as appropriate: allergies, current medications, past family history, past medical history, past social history, past surgical history, and problem list.  Past Medical History:   Diagnosis Date    Allergic     Anxiety     Depression     Menstrual pain     Polycystic ovary syndrome     PTSD (post-traumatic stress disorder)      Past Surgical History:   Procedure Laterality Date    ENDOSCOPY      WISDOM TOOTH EXTRACTION       The patient has a family history of  Family History   Problem Relation Age of Onset    Diabetes Father     Skin cancer Father     Asthma Sister     Suicide Attempts Brother     Bipolar disorder Brother     Schizophrenia Brother     Mental illness Brother     Heart disease Maternal Grandmother      Social History     Socioeconomic History    Marital status: Single     Spouse name: Peter Glass    Number of children: 0    Highest education level: GED or equivalent   Tobacco Use    Smoking status: Former     Current packs/day: 0.00     Types: Cigarettes, Electronic Cigarette     Start date: 2016     Quit date: 2021     Years since quittin.8     Passive exposure: Never    Smokeless tobacco: Never   Vaping Use    Vaping status: Every Day     "Substances: Nicotine    Devices: Pre-filled or refillable cartridge   Substance and Sexual Activity    Alcohol use: Not Currently    Drug use: Not Currently     Types: Marijuana    Sexual activity: Defer     Partners: Male     Birth control/protection: Patch       Review of Systems   Constitutional:  Negative for chills and fever.   Genitourinary:  Positive for urinary incontinence. Negative for difficulty urinating, dyspareunia, dysuria and frequency.     Visit Vitals  /72 (BP Location: Left arm, Patient Position: Sitting, Cuff Size: Adult)   Pulse 90   Temp 98.9 °F (37.2 °C) (Infrared)   Resp 18   Ht 170.2 cm (67\")   Wt 111 kg (244 lb 3.2 oz)   SpO2 96%   BMI 38.25 kg/m²              Current Outpatient Medications:     medroxyPROGESTERone (DEPO-PROVERA) 150 MG/ML injection, Inject 1 mL into the appropriate muscle as directed by prescriber Every 3 (Three) Months., Disp: , Rfl:     sertraline (Zoloft) 25 MG tablet, Take 1 tablet by mouth Daily., Disp: 30 tablet, Rfl: 0    amoxicillin (AMOXIL) 500 MG capsule, Take 1 capsule by mouth 3 (Three) Times a Day. (Patient not taking: Reported on 5/13/2024), Disp: 30 capsule, Rfl: 0    Objective   Physical Exam  Constitutional:       General: She is not in acute distress.     Appearance: Normal appearance. She is well-developed. She is not ill-appearing or diaphoretic.      Comments: Patient is in no distress, patient has normal voice and speech.  Normal respiratory effort.   HENT:      Head: Normocephalic and atraumatic.   Pulmonary:      Effort: Pulmonary effort is normal.   Abdominal:      General: There is no distension.      Palpations: There is no mass.      Tenderness: There is no abdominal tenderness. There is no guarding or rebound.      Hernia: No hernia is present.   Musculoskeletal:      Cervical back: Normal range of motion and neck supple.   Neurological:      General: No focal deficit present.      Mental Status: She is alert and oriented to person, " place, and time. Mental status is at baseline.   Psychiatric:         Mood and Affect: Mood normal.       Assessment & Plan   Diagnoses and all orders for this visit:    1. Urinary incontinence, unspecified type (Primary)  -     POC Pregnancy, Urine  -     POC Urinalysis Dipstick, Automated      Urine dip is within acceptable limits, her pregnancy test is negative.  Patient was encouraged to keep up with p.o. water intake and monitor the symptoms.  She only had 1 situation when the urine leaked yesterday.  If this is to continue to happen from now on she will need to be reevaluated and follow-up with her medical doctor or gynecologist.      Return if symptoms worsen or fail to improve, for Recheck.    Requested Prescriptions      No prescriptions requested or ordered in this encounter       Merlene Romero MD  05/13/2024

## 2024-06-17 ENCOUNTER — LAB (OUTPATIENT)
Dept: FAMILY MEDICINE CLINIC | Facility: CLINIC | Age: 19
End: 2024-06-17
Payer: COMMERCIAL

## 2024-06-17 ENCOUNTER — OFFICE VISIT (OUTPATIENT)
Dept: FAMILY MEDICINE CLINIC | Facility: CLINIC | Age: 19
End: 2024-06-17
Payer: COMMERCIAL

## 2024-06-17 VITALS
BODY MASS INDEX: 38.69 KG/M2 | OXYGEN SATURATION: 96 % | HEART RATE: 95 BPM | TEMPERATURE: 97.7 F | HEIGHT: 67 IN | WEIGHT: 246.5 LBS | SYSTOLIC BLOOD PRESSURE: 103 MMHG | DIASTOLIC BLOOD PRESSURE: 67 MMHG

## 2024-06-17 DIAGNOSIS — Z83.3 FH: DIABETES MELLITUS: ICD-10-CM

## 2024-06-17 DIAGNOSIS — R53.83 OTHER FATIGUE: ICD-10-CM

## 2024-06-17 DIAGNOSIS — Z83.49 FH: THYROID DISEASE: ICD-10-CM

## 2024-06-17 DIAGNOSIS — D50.9 IRON DEFICIENCY ANEMIA, UNSPECIFIED IRON DEFICIENCY ANEMIA TYPE: Primary | ICD-10-CM

## 2024-06-17 PROCEDURE — 84466 ASSAY OF TRANSFERRIN: CPT | Performed by: FAMILY MEDICINE

## 2024-06-17 PROCEDURE — 36415 COLL VENOUS BLD VENIPUNCTURE: CPT | Performed by: FAMILY MEDICINE

## 2024-06-17 PROCEDURE — 99213 OFFICE O/P EST LOW 20 MIN: CPT | Performed by: FAMILY MEDICINE

## 2024-06-17 PROCEDURE — 80050 GENERAL HEALTH PANEL: CPT | Performed by: FAMILY MEDICINE

## 2024-06-17 PROCEDURE — 83036 HEMOGLOBIN GLYCOSYLATED A1C: CPT | Performed by: FAMILY MEDICINE

## 2024-06-17 PROCEDURE — 83540 ASSAY OF IRON: CPT | Performed by: FAMILY MEDICINE

## 2024-06-17 RX ORDER — ESCITALOPRAM OXALATE 20 MG/1
20 TABLET ORAL DAILY
COMMUNITY
Start: 2024-06-01

## 2024-06-17 NOTE — PROGRESS NOTES
"Chief Complaint  Fatigue and Vomiting    Subjective        Trupti Rae presents to [facility]  Fatigue  This is a new problem. The current episode started more than 1 month ago. The problem occurs constantly. The problem has been unchanged. Associated symptoms include fatigue and vomiting. Nothing aggravates the symptoms. She has tried nothing for the symptoms.   Vomiting         Objective   Vital Signs:  /67 (BP Location: Right arm, Patient Position: Sitting, Cuff Size: Large Adult)   Pulse 95   Temp 97.7 °F (36.5 °C) (Infrared)   Ht 170.2 cm (67\")   Wt 112 kg (246 lb 8 oz)   SpO2 96%   BMI 38.61 kg/m²   Estimated body mass index is 38.61 kg/m² as calculated from the following:    Height as of this encounter: 170.2 cm (67\").    Weight as of this encounter: 112 kg (246 lb 8 oz).               Physical Exam  Vitals and nursing note reviewed.   Constitutional:       General: She is not in acute distress.     Appearance: She is well-developed.   HENT:      Head: Normocephalic.   Eyes:      General: Lids are normal.      Conjunctiva/sclera: Conjunctivae normal.   Neck:      Thyroid: No thyroid mass or thyromegaly.      Trachea: Trachea normal.   Cardiovascular:      Rate and Rhythm: Normal rate and regular rhythm.      Heart sounds: Normal heart sounds.   Pulmonary:      Effort: Pulmonary effort is normal.      Breath sounds: Normal breath sounds.   Abdominal:      Palpations: Abdomen is soft.   Musculoskeletal:      Cervical back: Normal range of motion.   Lymphadenopathy:      Cervical: No cervical adenopathy.   Skin:     General: Skin is warm and dry.   Neurological:      Mental Status: She is alert and oriented to person, place, and time.   Psychiatric:         Attention and Perception: She is attentive.         Mood and Affect: Mood normal.         Speech: Speech normal.         Behavior: Behavior normal.        Result Review :    The following data was reviewed by: " Barb Willingham MD on 06/17/2024:  Common labs          11/17/2023    20:22 11/19/2023    05:22 6/17/2024    14:12   Common Labs   Glucose  77  75    BUN  7  17    Creatinine  0.60  0.87    Sodium  138  140    Potassium  4.3  4.1    Chloride  108  106    Calcium  8.5  9.6    Albumin  2.9  4.6    Total Bilirubin  0.3  <0.2    Alkaline Phosphatase  86  75    AST (SGOT)  27  29    ALT (SGPT)  9  31    WBC 12.00  15.30  11.27    Hemoglobin 13.0  10.7  14.5    Hematocrit 38.4  32.1  44.0    Platelets 280  247  347    Hemoglobin A1C   5.40                   Assessment and Plan     Diagnoses and all orders for this visit:    1. Iron deficiency anemia, unspecified iron deficiency anemia type (Primary)  -     CBC & Differential  -     Comprehensive Metabolic Panel  -     Hemoglobin A1c  -     Iron Profile  -     TSH    2. Other fatigue  -     CBC & Differential  -     Comprehensive Metabolic Panel  -     Hemoglobin A1c  -     Iron Profile  -     TSH    3. FH: diabetes mellitus  -     CBC & Differential  -     Comprehensive Metabolic Panel  -     Hemoglobin A1c  -     Iron Profile  -     TSH    4. FH: thyroid disease  -     CBC & Differential  -     Comprehensive Metabolic Panel  -     Hemoglobin A1c  -     Iron Profile  -     TSH             Follow Up     No follow-ups on file.  Patient was given instructions and counseling regarding her condition or for health maintenance advice. Please see specific information pulled into the AVS if appropriate.

## 2024-06-18 ENCOUNTER — TELEPHONE (OUTPATIENT)
Dept: FAMILY MEDICINE CLINIC | Facility: CLINIC | Age: 19
End: 2024-06-18
Payer: COMMERCIAL

## 2024-06-18 DIAGNOSIS — D50.9 IRON DEFICIENCY ANEMIA, UNSPECIFIED IRON DEFICIENCY ANEMIA TYPE: Primary | ICD-10-CM

## 2024-06-18 LAB
ALBUMIN SERPL-MCNC: 4.6 G/DL (ref 3.5–5.2)
ALBUMIN/GLOB SERPL: 1.4 G/DL
ALP SERPL-CCNC: 75 U/L (ref 39–117)
ALT SERPL W P-5'-P-CCNC: 31 U/L (ref 1–33)
ANION GAP SERPL CALCULATED.3IONS-SCNC: 13 MMOL/L (ref 5–15)
AST SERPL-CCNC: 29 U/L (ref 1–32)
BASOPHILS # BLD AUTO: 0.05 10*3/MM3 (ref 0–0.2)
BASOPHILS NFR BLD AUTO: 0.4 % (ref 0–1.5)
BILIRUB SERPL-MCNC: <0.2 MG/DL (ref 0–1.2)
BUN SERPL-MCNC: 17 MG/DL (ref 6–20)
BUN/CREAT SERPL: 19.5 (ref 7–25)
CALCIUM SPEC-SCNC: 9.6 MG/DL (ref 8.6–10.5)
CHLORIDE SERPL-SCNC: 106 MMOL/L (ref 98–107)
CO2 SERPL-SCNC: 21 MMOL/L (ref 22–29)
CREAT SERPL-MCNC: 0.87 MG/DL (ref 0.57–1)
DEPRECATED RDW RBC AUTO: 41.7 FL (ref 37–54)
EGFRCR SERPLBLD CKD-EPI 2021: 97.3 ML/MIN/1.73
EOSINOPHIL # BLD AUTO: 0.17 10*3/MM3 (ref 0–0.4)
EOSINOPHIL NFR BLD AUTO: 1.5 % (ref 0.3–6.2)
ERYTHROCYTE [DISTWIDTH] IN BLOOD BY AUTOMATED COUNT: 13.7 % (ref 12.3–15.4)
GLOBULIN UR ELPH-MCNC: 3.2 GM/DL
GLUCOSE SERPL-MCNC: 75 MG/DL (ref 65–99)
HBA1C MFR BLD: 5.4 % (ref 4.8–5.6)
HCT VFR BLD AUTO: 44 % (ref 34–46.6)
HGB BLD-MCNC: 14.5 G/DL (ref 12–15.9)
IMM GRANULOCYTES # BLD AUTO: 0.07 10*3/MM3 (ref 0–0.05)
IMM GRANULOCYTES NFR BLD AUTO: 0.6 % (ref 0–0.5)
IRON 24H UR-MRATE: 61 MCG/DL (ref 37–145)
IRON SATN MFR SERPL: 14 % (ref 20–50)
LYMPHOCYTES # BLD AUTO: 3.63 10*3/MM3 (ref 0.7–3.1)
LYMPHOCYTES NFR BLD AUTO: 32.2 % (ref 19.6–45.3)
MCH RBC QN AUTO: 27.9 PG (ref 26.6–33)
MCHC RBC AUTO-ENTMCNC: 33 G/DL (ref 31.5–35.7)
MCV RBC AUTO: 84.8 FL (ref 79–97)
MONOCYTES # BLD AUTO: 0.8 10*3/MM3 (ref 0.1–0.9)
MONOCYTES NFR BLD AUTO: 7.1 % (ref 5–12)
NEUTROPHILS NFR BLD AUTO: 58.2 % (ref 42.7–76)
NEUTROPHILS NFR BLD AUTO: 6.55 10*3/MM3 (ref 1.7–7)
NRBC BLD AUTO-RTO: 0 /100 WBC (ref 0–0.2)
PLATELET # BLD AUTO: 347 10*3/MM3 (ref 140–450)
PMV BLD AUTO: 9 FL (ref 6–12)
POTASSIUM SERPL-SCNC: 4.1 MMOL/L (ref 3.5–5.2)
PROT SERPL-MCNC: 7.8 G/DL (ref 6–8.5)
RBC # BLD AUTO: 5.19 10*6/MM3 (ref 3.77–5.28)
SODIUM SERPL-SCNC: 140 MMOL/L (ref 136–145)
TIBC SERPL-MCNC: 432 MCG/DL (ref 298–536)
TRANSFERRIN SERPL-MCNC: 290 MG/DL (ref 200–360)
TSH SERPL DL<=0.05 MIU/L-ACNC: 1.92 UIU/ML (ref 0.27–4.2)
WBC NRBC COR # BLD AUTO: 11.27 10*3/MM3 (ref 3.4–10.8)

## 2024-06-18 NOTE — TELEPHONE ENCOUNTER
Caller: Trupti Rae    Relationship: Self    Best call back number: [phone]     Caller requesting test results: TRUPTI    What test was performed: LABS    When was the test performed: 6/17/24    Where was the test performed: IN OFFICE    Additional notes:

## 2024-06-18 NOTE — TELEPHONE ENCOUNTER
Pt was verbally informed of her results and asked if you could call in an iron supplement for her

## 2024-06-19 RX ORDER — FERROUS SULFATE 325(65) MG
325 TABLET ORAL
Qty: 90 TABLET | Refills: 1 | Status: SHIPPED | OUTPATIENT
Start: 2024-06-19

## 2024-06-19 NOTE — TELEPHONE ENCOUNTER
CALLED AND ADVISED PATIENT THE MEDICINE WAS SENT TO HER PHARMACY. PATIENT EXPRESSED UNDERSTANDING.

## 2024-06-27 ENCOUNTER — OFFICE VISIT (OUTPATIENT)
Dept: PODIATRY | Facility: CLINIC | Age: 19
End: 2024-06-27
Payer: COMMERCIAL

## 2024-06-27 VITALS
OXYGEN SATURATION: 98 % | BODY MASS INDEX: 38.61 KG/M2 | HEIGHT: 67 IN | WEIGHT: 246 LBS | RESPIRATION RATE: 20 BRPM | HEART RATE: 66 BPM

## 2024-06-27 DIAGNOSIS — E66.9 OBESITY (BMI 30-39.9): ICD-10-CM

## 2024-06-27 DIAGNOSIS — M25.872 ANKLE IMPINGEMENT SYNDROME, LEFT: ICD-10-CM

## 2024-06-27 DIAGNOSIS — M25.572 CHRONIC PAIN OF LEFT ANKLE: Primary | ICD-10-CM

## 2024-06-27 DIAGNOSIS — G89.29 CHRONIC PAIN OF LEFT ANKLE: Primary | ICD-10-CM

## 2024-06-28 ENCOUNTER — PATIENT ROUNDING (BHMG ONLY) (OUTPATIENT)
Dept: PODIATRY | Facility: CLINIC | Age: 19
End: 2024-06-28
Payer: COMMERCIAL

## 2024-06-28 NOTE — PROGRESS NOTES
2024  Foot and Ankle Surgery - New Patient   Provider: Dr. Ryan Larsen DPM  Location: [facility]    Subjective:  Trupti Rae is a 19 y.o. female.     Chief Complaint   Patient presents with    Left Foot - Initial Evaluation, Pain     Top of foot pain    Initial Evaluation     EDUAR Willingham last pcp visit 2024       History of Present Illness  The patient presents for evaluation of left foot pain.    The patient began experiencing pain in her left foot post-delivery, which radiates to the anterior and lateral aspects of the ankle. Despite undergoing radiographic examination by her primary care physician, no abnormalities were detected. The pain, which fluctuates in intensity, varies in intensity from day to day. She speculates that her weight gain during her pregnancy may have contributed to the discomfort.       Allergies   Allergen Reactions    Cashew Nut Swelling       Past Medical History:   Diagnosis Date    Allergic     Anxiety     Depression     Menstrual pain     Polycystic ovary syndrome     PTSD (post-traumatic stress disorder)        Past Surgical History:   Procedure Laterality Date    ENDOSCOPY      WISDOM TOOTH EXTRACTION         Family History   Problem Relation Age of Onset    Diabetes Father     Skin cancer Father     Asthma Sister     Suicide Attempts Brother     Bipolar disorder Brother     Schizophrenia Brother     Mental illness Brother     Heart disease Maternal Grandmother        Social History     Socioeconomic History    Marital status: Single     Spouse name: Peter Glass    Number of children: 0    Highest education level: GED or equivalent   Tobacco Use    Smoking status: Former     Current packs/day: 0.00     Types: Cigarettes, Electronic Cigarette     Start date: 2016     Quit date: 2021     Years since quittin.9     Passive exposure: Never    Smokeless tobacco: Never   Vaping Use    Vaping status: Every Day    Substances: Nicotine    Devices:  "Pre-filled or refillable cartridge   Substance and Sexual Activity    Alcohol use: Not Currently    Drug use: Not Currently     Types: Marijuana    Sexual activity: Defer     Partners: Male     Birth control/protection: Patch        Current Outpatient Medications on File Prior to Visit   Medication Sig Dispense Refill    escitalopram (LEXAPRO) 20 MG tablet Take 1 tablet by mouth Daily.      ferrous sulfate 325 (65 FE) MG tablet Take 1 tablet by mouth Daily With Breakfast. 90 tablet 1    medroxyPROGESTERone (DEPO-PROVERA) 150 MG/ML injection Inject 1 mL into the appropriate muscle as directed by prescriber Every 3 (Three) Months.      sertraline (Zoloft) 25 MG tablet Take 1 tablet by mouth Daily. 30 tablet 0     No current facility-administered medications on file prior to visit.         Objective   Pulse 66   Resp 20   Ht 170.2 cm (67\")   Wt 112 kg (246 lb)   SpO2 98%   BMI 38.53 kg/m²     Foot/Ankle Exam    GENERAL  Appearance:  appears stated age  Orientation:  AAOx3  Affect:  appropriate    VASCULAR     Right Foot Vascularity   Normal vascular exam    Dorsalis pedis:  2+  Posterior tibial:  2+  Skin temperature:  warm  Edema grading:  None  CFT:  < 3 seconds  Pedal hair growth:  Present  Varicosities:  none     Left Foot Vascularity   Normal vascular exam    Dorsalis pedis:  2+  Posterior tibial:  2+  Skin temperature:  warm  Edema grading:  None  CFT:  < 3 seconds  Pedal hair growth:  Present  Varicosities:  none     NEUROLOGIC     Right Foot Neurologic   Light touch sensation: normal  Hot/Cold sensation: normal  Achilles reflex:  2+     Left Foot Neurologic   Light touch sensation: normal  Hot/Cold sensation:  normal  Achilles reflex:  2+    MUSCULOSKELETAL     Right Foot Musculoskeletal   Arch:  Normal     Left Foot Musculoskeletal   Ecchymosis:  none  Tenderness:  ankle joint tenderness and anterior ankle tenderness  Arch:  Normal    MUSCLE STRENGTH     Right Foot Muscle Strength   Normal strength  "   Foot dorsiflexion:  5  Foot plantar flexion:  5  Foot inversion:  5  Foot eversion:  5     Left Foot Muscle Strength   Normal strength    Foot dorsiflexion:  5  Foot plantar flexion:  5  Foot inversion:  5  Foot eversion:  5    DERMATOLOGIC      Right Foot Dermatologic   Skin  Right foot skin is intact.   Nails comment:  Nails 1-5     Left Foot Dermatologic   Skin  Left foot skin is intact.   Nails comment:  Nails 1-5    TESTS     Right Foot Tests   Anterior drawer: negative  Varus tilt: negative     Left Foot Tests   Anterior drawer: negative  Varus tilt: negative     Left foot additional comments: No obvious deformity or instability.  Mild discomfort involving the anterior lateral aspect of the ankle.  Mild equinus contracture with knee extended and flexed.    Physical Exam         Results  Imaging  Foot x-ray shows no fractures.       Assessment & Plan   Diagnoses and all orders for this visit:    1. Chronic pain of left ankle (Primary)    2. Ankle impingement syndrome, left    3. Obesity (BMI 30-39.9)       Assessment & Plan  .  The clinical presentation suggests a functional nature of the condition, which is expected to continue to improve over time. The patient was advised to utilize over-the-counter arch supports in her shoes and to incorporate stretching exercises into her routine. A low-impact exercise regimen, such as biking, elliptical training, swimming, and water aerobics, was suggested. A prescription for a lace-up ankle brace was provided.Reviewed proper basic stretching and manual therapy exercises along with appropriate shoes and activity.  Discussed proper use and/or avoidance of OTC anti-inflammatories.  Patient is to call with any additional issues or concerns.  Greater than 30 minutes was spent before, during, and after evaluation for patient care.    Follow-up  A follow-up appointment is scheduled for 4 to 6 weeks from now.           Patient or patient representative verbalized consent for  the use of Ambient Listening during the visit with  TEZ Larsen DPM for chart documentation. 6/28/2024  12:07 EDT    TEZ Larsen DPM

## 2024-07-01 ENCOUNTER — OFFICE VISIT (OUTPATIENT)
Dept: FAMILY MEDICINE CLINIC | Facility: CLINIC | Age: 19
End: 2024-07-01
Payer: COMMERCIAL

## 2024-07-01 VITALS
OXYGEN SATURATION: 95 % | BODY MASS INDEX: 39.96 KG/M2 | HEIGHT: 67 IN | DIASTOLIC BLOOD PRESSURE: 52 MMHG | TEMPERATURE: 97.4 F | HEART RATE: 85 BPM | WEIGHT: 254.6 LBS | SYSTOLIC BLOOD PRESSURE: 106 MMHG

## 2024-07-01 DIAGNOSIS — R11.2 NAUSEA AND VOMITING, UNSPECIFIED VOMITING TYPE: Primary | ICD-10-CM

## 2024-07-01 DIAGNOSIS — R55 POSTURAL DIZZINESS WITH NEAR SYNCOPE: ICD-10-CM

## 2024-07-01 DIAGNOSIS — R42 POSTURAL DIZZINESS WITH NEAR SYNCOPE: ICD-10-CM

## 2024-07-01 PROCEDURE — 99213 OFFICE O/P EST LOW 20 MIN: CPT | Performed by: FAMILY MEDICINE

## 2024-07-01 NOTE — PROGRESS NOTES
"Chief Complaint  Vomiting (After eating ), Nausea, Diarrhea, and Fatigue    Subjective        Trupti Rae presents to [facility]  History of Present Illness  Frequently gets nausea and vomiting after eating.  Occasional fever. FH of autoimmune disorders.  She feels like she could pass out - feels blurry vision, abdominal pain, muscle pains.  She has tried multiple different diets - keto, gluten free, etc - but nothing seems to improve her symptoms.    Vomiting   This is a new problem. The current episode started more than 1 month ago. The problem occurs 2 to 4 times per day. The problem has been unchanged. The emesis has an appearance of stomach contents. There has been no fever. Associated symptoms include diarrhea and myalgias. Pertinent negatives include no chest pain, chills, coughing or weight loss.   Nausea  Associated symptoms include fatigue, myalgias, nausea and vomiting. Pertinent negatives include no chest pain, chills or coughing.   Diarrhea   Associated symptoms include myalgias and vomiting. Pertinent negatives include no chills, coughing or weight loss.   Fatigue  Associated symptoms include fatigue, myalgias, nausea and vomiting. Pertinent negatives include no chest pain, chills or coughing.       Objective   Vital Signs:  /52 (BP Location: Left arm, Patient Position: Sitting, Cuff Size: Large Adult)   Pulse 85   Temp 97.4 °F (36.3 °C) (Infrared)   Ht 170.2 cm (67\")   Wt 115 kg (254 lb 9.6 oz)   SpO2 95%   BMI 39.88 kg/m²   Estimated body mass index is 39.88 kg/m² as calculated from the following:    Height as of this encounter: 170.2 cm (67\").    Weight as of this encounter: 115 kg (254 lb 9.6 oz).               Physical Exam  Vitals and nursing note reviewed.   Constitutional:       General: She is not in acute distress.     Appearance: She is well-developed.   HENT:      Head: Normocephalic.   Eyes:      General: Lids are normal.      " Conjunctiva/sclera: Conjunctivae normal.   Neck:      Thyroid: No thyroid mass or thyromegaly.      Trachea: Trachea normal.   Cardiovascular:      Rate and Rhythm: Normal rate and regular rhythm.      Heart sounds: Normal heart sounds.   Pulmonary:      Effort: Pulmonary effort is normal.      Breath sounds: Normal breath sounds.   Musculoskeletal:      Cervical back: Normal range of motion.   Lymphadenopathy:      Cervical: No cervical adenopathy.   Skin:     General: Skin is warm and dry.   Neurological:      Mental Status: She is alert and oriented to person, place, and time.   Psychiatric:         Attention and Perception: She is attentive.         Mood and Affect: Mood normal.         Speech: Speech normal.         Behavior: Behavior normal.        Result Review :    The following data was reviewed by: Barb Willingham MD on 07/01/2024:  Common labs          11/17/2023    20:22 11/19/2023    05:22 6/17/2024    14:12   Common Labs   Glucose  77  75    BUN  7  17    Creatinine  0.60  0.87    Sodium  138  140    Potassium  4.3  4.1    Chloride  108  106    Calcium  8.5  9.6    Albumin  2.9  4.6    Total Bilirubin  0.3  <0.2    Alkaline Phosphatase  86  75    AST (SGOT)  27  29    ALT (SGPT)  9  31    WBC 12.00  15.30  11.27    Hemoglobin 13.0  10.7  14.5    Hematocrit 38.4  32.1  44.0    Platelets 280  247  347    Hemoglobin A1C   5.40                   Assessment and Plan     Diagnoses and all orders for this visit:    1. Nausea and vomiting, unspecified vomiting type (Primary)  -     US Abdomen Limited; Future    2. Postural dizziness with near syncope  -     Ambulatory Referral to Cardiology             Follow Up     No follow-ups on file.  Patient was given instructions and counseling regarding her condition or for health maintenance advice. Please see specific information pulled into the AVS if appropriate.

## 2024-07-11 ENCOUNTER — HOSPITAL ENCOUNTER (OUTPATIENT)
Dept: ULTRASOUND IMAGING | Facility: HOSPITAL | Age: 19
Discharge: HOME | End: 2024-07-11
Admitting: FAMILY MEDICINE
Payer: COMMERCIAL

## 2024-07-11 DIAGNOSIS — R11.2 NAUSEA AND VOMITING, UNSPECIFIED VOMITING TYPE: ICD-10-CM

## 2024-07-11 PROCEDURE — 76705 ECHO EXAM OF ABDOMEN: CPT

## 2024-07-16 ENCOUNTER — CLINICAL SUPPORT (OUTPATIENT)
Dept: FAMILY MEDICINE CLINIC | Facility: CLINIC | Age: 19
End: 2024-07-16
Payer: COMMERCIAL

## 2024-07-16 DIAGNOSIS — Z78.9 USES DEPO-PROVERA AS PRIMARY BIRTH CONTROL METHOD: Primary | ICD-10-CM

## 2024-07-16 PROCEDURE — 96372 THER/PROPH/DIAG INJ SC/IM: CPT | Performed by: FAMILY MEDICINE

## 2024-07-16 RX ORDER — MEDROXYPROGESTERONE ACETATE 150 MG/ML
150 INJECTION, SUSPENSION INTRAMUSCULAR ONCE
Status: COMPLETED | OUTPATIENT
Start: 2024-07-16 | End: 2024-07-16

## 2024-07-16 RX ADMIN — MEDROXYPROGESTERONE ACETATE 150 MG: 150 INJECTION, SUSPENSION INTRAMUSCULAR at 16:00

## 2024-07-16 NOTE — PROGRESS NOTES
Injection  Injection performed in LT DELTOID by Eliza Jin MA. Patient tolerated the procedure well without complications.  07/16/24   Eliza Jin MA

## 2024-09-19 ENCOUNTER — TELEPHONE (OUTPATIENT)
Dept: FAMILY MEDICINE CLINIC | Facility: CLINIC | Age: 19
End: 2024-09-19

## 2024-11-04 ENCOUNTER — OFFICE VISIT (OUTPATIENT)
Dept: FAMILY MEDICINE CLINIC | Facility: CLINIC | Age: 19
End: 2024-11-04
Payer: COMMERCIAL

## 2024-11-04 VITALS — HEIGHT: 67 IN | BODY MASS INDEX: 40.35 KG/M2 | OXYGEN SATURATION: 96 % | WEIGHT: 257.1 LBS | HEART RATE: 77 BPM

## 2024-11-04 DIAGNOSIS — F33.2 MAJOR DEPRESSIVE DISORDER, RECURRENT SEVERE WITHOUT PSYCHOTIC FEATURES: ICD-10-CM

## 2024-11-04 DIAGNOSIS — R41.840 CONCENTRATION DEFICIT: Primary | ICD-10-CM

## 2024-11-04 PROCEDURE — 99213 OFFICE O/P EST LOW 20 MIN: CPT | Performed by: FAMILY MEDICINE

## 2024-11-04 RX ORDER — DEXTROAMPHETAMINE SACCHARATE, AMPHETAMINE ASPARTATE MONOHYDRATE, DEXTROAMPHETAMINE SULFATE AND AMPHETAMINE SULFATE 2.5; 2.5; 2.5; 2.5 MG/1; MG/1; MG/1; MG/1
10 CAPSULE, EXTENDED RELEASE ORAL EVERY MORNING
Qty: 30 CAPSULE | Refills: 0 | Status: SHIPPED | OUTPATIENT
Start: 2024-11-04

## 2024-11-04 NOTE — PROGRESS NOTES
"Chief Complaint  Depression    Subjective        Trupti Rae presents to [facility]  History of Present Illness  History of postpartum depression after the birth of her daughter last year.  She felt better for a few months but her symptoms have returned.   Depression  Presents for follow-up visit. Symptoms include decreased concentration, depressed mood, excessive worry, feelings of hopelessness, insomnia, irritability and weight gain. Patient is not experiencing: confusion, suicidal ideas, suicidal planning, thoughts of death and chest pain.Symptoms occur constantly.  The severity of symptoms is causing significant distress.  The quality of sleep is poor. Awakens often during the night. Her past medical history is significant for depression. Past treatments include medications, SSRI and non-SSRI antidepressants. The treatment provides mild relief.       Objective   Vital Signs:  Pulse 77   Ht 170.2 cm (67\")   Wt 117 kg (257 lb 1.6 oz)   SpO2 96%   BMI 40.27 kg/m²   Estimated body mass index is 40.27 kg/m² as calculated from the following:    Height as of this encounter: 170.2 cm (67\").    Weight as of this encounter: 117 kg (257 lb 1.6 oz).            Physical Exam  Vitals and nursing note reviewed.   Constitutional:       General: She is not in acute distress.     Appearance: She is well-developed.   HENT:      Head: Normocephalic.   Eyes:      General: Lids are normal.   Neck:      Thyroid: No thyroid mass or thyromegaly.      Trachea: Trachea normal.   Cardiovascular:      Rate and Rhythm: Normal rate and regular rhythm.      Heart sounds: Normal heart sounds.   Pulmonary:      Effort: Pulmonary effort is normal.      Breath sounds: Normal breath sounds.   Musculoskeletal:      Cervical back: Normal range of motion.   Lymphadenopathy:      Cervical: No cervical adenopathy.   Skin:     General: Skin is warm and dry.   Neurological:      Mental Status: She is alert and " oriented to person, place, and time.   Psychiatric:         Attention and Perception: She is attentive.         Mood and Affect: Mood normal.         Speech: Speech normal.         Behavior: Behavior normal.        Result Review :  The following data was reviewed by: Barb Willingham MD on 11/04/2024:  Common labs          11/17/2023    20:22 11/19/2023    05:22 6/17/2024    14:12   Common Labs   Glucose  77  75    BUN  7  17    Creatinine  0.60  0.87    Sodium  138  140    Potassium  4.3  4.1    Chloride  108  106    Calcium  8.5  9.6    Albumin  2.9  4.6    Total Bilirubin  0.3  <0.2    Alkaline Phosphatase  86  75    AST (SGOT)  27  29    ALT (SGPT)  9  31    WBC 12.00  15.30  11.27    Hemoglobin 13.0  10.7  14.5    Hematocrit 38.4  32.1  44.0    Platelets 280  247  347    Hemoglobin A1C   5.40                Assessment and Plan   Diagnoses and all orders for this visit:    1. Concentration deficit (Primary)  -     amphetamine-dextroamphetamine XR (Adderall XR) 10 MG 24 hr capsule; Take 1 capsule by mouth Every Morning  Dispense: 30 capsule; Refill: 0    2. Major depressive disorder, recurrent severe without psychotic features  -     amphetamine-dextroamphetamine XR (Adderall XR) 10 MG 24 hr capsule; Take 1 capsule by mouth Every Morning  Dispense: 30 capsule; Refill: 0             Follow Up   No follow-ups on file.  Patient was given instructions and counseling regarding her condition or for health maintenance advice. Please see specific information pulled into the AVS if appropriate.

## 2024-12-09 ENCOUNTER — OFFICE VISIT (OUTPATIENT)
Dept: FAMILY MEDICINE CLINIC | Facility: CLINIC | Age: 19
End: 2024-12-09
Payer: COMMERCIAL

## 2024-12-09 VITALS
TEMPERATURE: 98.4 F | OXYGEN SATURATION: 93 % | SYSTOLIC BLOOD PRESSURE: 127 MMHG | HEART RATE: 113 BPM | BODY MASS INDEX: 39.24 KG/M2 | DIASTOLIC BLOOD PRESSURE: 82 MMHG | HEIGHT: 67 IN | WEIGHT: 250 LBS

## 2024-12-09 DIAGNOSIS — R41.840 CONCENTRATION DEFICIT: Primary | ICD-10-CM

## 2024-12-09 DIAGNOSIS — F41.9 SEVERE ANXIETY: ICD-10-CM

## 2024-12-09 PROCEDURE — 99214 OFFICE O/P EST MOD 30 MIN: CPT | Performed by: FAMILY MEDICINE

## 2024-12-09 RX ORDER — DEXTROAMPHETAMINE SACCHARATE, AMPHETAMINE ASPARTATE MONOHYDRATE, DEXTROAMPHETAMINE SULFATE AND AMPHETAMINE SULFATE 5; 5; 5; 5 MG/1; MG/1; MG/1; MG/1
20 CAPSULE, EXTENDED RELEASE ORAL EVERY MORNING
Qty: 30 CAPSULE | Refills: 0 | Status: SHIPPED | OUTPATIENT
Start: 2024-12-09

## 2024-12-09 NOTE — PROGRESS NOTES
"Chief Complaint  Anxiety (MEDICATION ADJUSTMENT )    Subjective        Trupti Rae presents to [facility]  History of Present Illness  She feels like her Adderall is wearing off after only a few hours.  She would like an increase in the dose of the Adderall and Zoloft.     Medication adjustment plus possibly adding another.  Pertinent negative symptoms include no abdominal pain, no anorexia, no joint pain, no change in stool, no chest pain, no chills, no congestion, no cough, no diaphoresis, no fatigue, no fever, no headaches, no joint swelling, no myalgias, no nausea, no neck pain, no numbness, no rash, no sore throat, no swollen glands, no dysuria, no vertigo, no visual change, no vomiting and no weakness.   Aggravating factors include nothing.       Objective   Vital Signs:  /82   Pulse 113   Temp 98.4 °F (36.9 °C) (Temporal)   Ht 170.2 cm (67\")   Wt 113 kg (250 lb)   SpO2 93%   BMI 39.16 kg/m²   Estimated body mass index is 39.16 kg/m² as calculated from the following:    Height as of this encounter: 170.2 cm (67\").    Weight as of this encounter: 113 kg (250 lb).            Physical Exam  Vitals and nursing note reviewed.   Constitutional:       General: She is not in acute distress.     Appearance: She is well-developed.   HENT:      Head: Normocephalic.   Eyes:      General: Lids are normal.   Neck:      Thyroid: No thyroid mass or thyromegaly.      Trachea: Trachea normal.   Cardiovascular:      Rate and Rhythm: Normal rate and regular rhythm.      Heart sounds: Normal heart sounds.   Pulmonary:      Effort: Pulmonary effort is normal.      Breath sounds: Normal breath sounds.   Musculoskeletal:      Cervical back: Normal range of motion.   Lymphadenopathy:      Cervical: No cervical adenopathy.   Skin:     General: Skin is warm and dry.   Neurological:      General: No focal deficit present.      Mental Status: She is alert and oriented to person, place, and " time.   Psychiatric:         Attention and Perception: She is attentive.         Mood and Affect: Mood normal.         Speech: Speech normal.         Behavior: Behavior normal.        Result Review :  The following data was reviewed by: Barb Willingham MD on 12/09/2024:  Common labs          6/17/2024    14:12   Common Labs   Glucose 75    BUN 17    Creatinine 0.87    Sodium 140    Potassium 4.1    Chloride 106    Calcium 9.6    Albumin 4.6    Total Bilirubin <0.2    Alkaline Phosphatase 75    AST (SGOT) 29    ALT (SGPT) 31    WBC 11.27    Hemoglobin 14.5    Hematocrit 44.0    Platelets 347    Hemoglobin A1C 5.40                Assessment and Plan   Diagnoses and all orders for this visit:    1. Concentration deficit (Primary)  Assessment & Plan:  Increase dose of Adderall XR from 10 mg to 20 mg qd.    Orders:  -     amphetamine-dextroamphetamine XR (Adderall XR) 20 MG 24 hr capsule; Take 1 capsule by mouth Every Morning  Dispense: 30 capsule; Refill: 0    2. Severe anxiety  -     sertraline (Zoloft) 50 MG tablet; Take 1 tablet by mouth Daily.  Dispense: 90 tablet; Refill: 3             Follow Up   No follow-ups on file.  Patient was given instructions and counseling regarding her condition or for health maintenance advice. Please see specific information pulled into the AVS if appropriate.             Answers submitted by the patient for this visit:  Primary Reason for Visit (Submitted on 12/9/2024)  What is the primary reason for your visit?: Problem Not Listed

## 2024-12-27 ENCOUNTER — TELEPHONE (OUTPATIENT)
Dept: FAMILY MEDICINE CLINIC | Facility: CLINIC | Age: 19
End: 2024-12-27

## 2024-12-27 NOTE — TELEPHONE ENCOUNTER
I sent in a Rx for Paxlovid to [facility] [address] [facility].   Quarantine until symptoms resolve - generally about 5 days.  Wear an N95 mask when around others for 10 days. Take Tylenol over the counter as needed for pain or fever.  Take Mucinex over the counter as needed for cough. Rest.  Stay hydrated.  Go to the ER if symptoms worsen.

## 2024-12-27 NOTE — TELEPHONE ENCOUNTER
Caller: Trupti Rae    Relationship: Self    Best call back number: [phone]    What medication are you requesting: ANYTIME    What are your current symptoms: POSITIVE AT HOME COVID TEST     How long have you been experiencing symptoms: 3RD DAY    Have you had these symptoms before:    [] Yes  [x] No    Have you been treated for these symptoms before:   [] Yes  [x] No    If a prescription is needed, what is your preferred pharmacy and phone number:  [id number] - [place], [place] - [address] [phone] [facility] [phone]      Additional notes:    PATIENT IS CALLING TO SEE IF SHE CAN GET ANYTHING PRESCRIBED TO HER FOR HER POSITIVE COVID TEST. OR ANYTHING OVER THE COUNTER THAT YOU COULD RECOMMEND    PLEASE CALL TO CONFIRM OR DENY

## 2025-01-15 DIAGNOSIS — R41.840 CONCENTRATION DEFICIT: ICD-10-CM

## 2025-01-15 RX ORDER — DEXTROAMPHETAMINE SACCHARATE, AMPHETAMINE ASPARTATE MONOHYDRATE, DEXTROAMPHETAMINE SULFATE AND AMPHETAMINE SULFATE 5; 5; 5; 5 MG/1; MG/1; MG/1; MG/1
20 CAPSULE, EXTENDED RELEASE ORAL EVERY MORNING
Qty: 30 CAPSULE | Refills: 0 | Status: SHIPPED | OUTPATIENT
Start: 2025-01-15

## 2025-01-15 NOTE — TELEPHONE ENCOUNTER
Caller: Trupti Rae    Relationship: Self    Best call back number: [phone]    Requested Prescriptions:   Requested Prescriptions     Pending Prescriptions Disp Refills    amphetamine-dextroamphetamine XR (Adderall XR) 20 MG 24 hr capsule 30 capsule 0     Sig: Take 1 capsule by mouth Every Morning      [id number] [address] AT [facility] [phone] [facility] [phone]      Last office visit with prescribing clinician: 12/9/2024   Last telemedicine visit with prescribing clinician: Visit date not found   Next office visit with prescribing clinician: 3/12/2025     Additional details provided by patient: PT IS [place] OF TOWN.     Does the patient have less than a 3 day supply:  [] Yes  [x] No    Would you like a call back once the refill request has been completed: [] Yes [x] No    Latoya Loaiza Rep   01/15/25 08:57 EST

## 2025-01-28 DIAGNOSIS — R41.840 CONCENTRATION DEFICIT: ICD-10-CM

## 2025-01-28 RX ORDER — DEXTROAMPHETAMINE SACCHARATE, AMPHETAMINE ASPARTATE MONOHYDRATE, DEXTROAMPHETAMINE SULFATE AND AMPHETAMINE SULFATE 5; 5; 5; 5 MG/1; MG/1; MG/1; MG/1
20 CAPSULE, EXTENDED RELEASE ORAL EVERY MORNING
Qty: 30 CAPSULE | Refills: 0 | Status: SHIPPED | OUTPATIENT
Start: 2025-01-28

## 2025-01-28 NOTE — TELEPHONE ENCOUNTER
Caller: Trupti Rae    Relationship: Self    Best call back number: [phone]    Requested Prescriptions: (SEE COMMENTS)  Requested Prescriptions     Pending Prescriptions Disp Refills    amphetamine-dextroamphetamine XR (Adderall XR) 20 MG 24 hr capsule 30 capsule 0     Sig: Take 1 capsule by mouth Every Morning        Pharmacy where request should be sent: [facility] #[id number] - [place], [place] - [address] AT [facility] [phone] [facility] [phone] FX     Last office visit with prescribing clinician: 12/9/2024   Last telemedicine visit with prescribing clinician: Visit date not found   Next office visit with prescribing clinician: 3/12/2025     Additional details provided by patient: PATIENT STATES THE ORDER THAT WAS SENT TO [facility] [place] ON 1/15/25, WAS NEVER DISPENSED DUE TO MEDICATION OUT OF STOCK. THE PATIENT IS BACK HOME AND REQUESTING A PRESCRIPTION TO BE SENT TO [facility]. THE PATIENT IS OUT OF THIS MEDICATION.    Does the patient have less than a 3 day supply:  [x] Yes  [] No    Would you like a call back once the refill request has been completed: [] Yes [x] No    If the office needs to give you a call back, can they leave a voicemail: [] Yes [x] No    Charlene Hartmann   01/28/25 15:36 EST

## 2025-02-24 DIAGNOSIS — R41.840 CONCENTRATION DEFICIT: ICD-10-CM

## 2025-02-24 RX ORDER — DEXTROAMPHETAMINE SACCHARATE, AMPHETAMINE ASPARTATE MONOHYDRATE, DEXTROAMPHETAMINE SULFATE AND AMPHETAMINE SULFATE 5; 5; 5; 5 MG/1; MG/1; MG/1; MG/1
20 CAPSULE, EXTENDED RELEASE ORAL EVERY MORNING
Qty: 30 CAPSULE | Refills: 0 | Status: SHIPPED | OUTPATIENT
Start: 2025-02-24

## 2025-02-24 NOTE — TELEPHONE ENCOUNTER
Caller: Trupti Rae    Relationship: Self    Best call back number:     [phone]       Requested Prescriptions:   Requested Prescriptions     Pending Prescriptions Disp Refills    amphetamine-dextroamphetamine XR (Adderall XR) 20 MG 24 hr capsule 30 capsule 0     Sig: Take 1 capsule by mouth Every Morning        Pharmacy where request should be sent: [id number] - [place], [place] - [address] AT [facility] OF [facility], [facility] [phone] [facility] [phone] FX     Last office visit with prescribing clinician: 12/9/2024   Last telemedicine visit with prescribing clinician: Visit date not found   Next office visit with prescribing clinician: 3/12/2025     Additional details provided by patient:     Does the patient have less than a 3 day supply:  [] Yes  [x] No    Would you like a call back once the refill request has been completed: [] Yes [] No    If the office needs to give you a call back, can they leave a voicemail: [] Yes [] No    Latoya King Rep   02/24/25 13:39 EST

## 2025-03-31 ENCOUNTER — OFFICE VISIT (OUTPATIENT)
Dept: FAMILY MEDICINE CLINIC | Facility: CLINIC | Age: 20
End: 2025-03-31
Payer: COMMERCIAL

## 2025-03-31 VITALS
TEMPERATURE: 97.5 F | HEIGHT: 67 IN | WEIGHT: 243.4 LBS | HEART RATE: 99 BPM | OXYGEN SATURATION: 96 % | RESPIRATION RATE: 18 BRPM | SYSTOLIC BLOOD PRESSURE: 110 MMHG | DIASTOLIC BLOOD PRESSURE: 74 MMHG | BODY MASS INDEX: 38.2 KG/M2

## 2025-03-31 DIAGNOSIS — G89.29 CHRONIC PAIN OF RIGHT KNEE: ICD-10-CM

## 2025-03-31 DIAGNOSIS — R06.02 SHORTNESS OF BREATH: ICD-10-CM

## 2025-03-31 DIAGNOSIS — Z72.89 ENGAGES IN VAPING: ICD-10-CM

## 2025-03-31 DIAGNOSIS — R41.840 CONCENTRATION DEFICIT: ICD-10-CM

## 2025-03-31 DIAGNOSIS — Z30.011 ENCOUNTER FOR INITIAL PRESCRIPTION OF CONTRACEPTIVE PILLS: ICD-10-CM

## 2025-03-31 DIAGNOSIS — R05.3 CHRONIC COUGH: Primary | ICD-10-CM

## 2025-03-31 DIAGNOSIS — M25.561 CHRONIC PAIN OF RIGHT KNEE: ICD-10-CM

## 2025-03-31 PROCEDURE — 99214 OFFICE O/P EST MOD 30 MIN: CPT | Performed by: FAMILY MEDICINE

## 2025-03-31 RX ORDER — LEVONORGESTREL AND ETHINYL ESTRADIOL 0.15-0.03
1 KIT ORAL DAILY
Qty: 28 TABLET | Refills: 12 | Status: SHIPPED | OUTPATIENT
Start: 2025-03-31 | End: 2026-03-31

## 2025-03-31 RX ORDER — DEXTROAMPHETAMINE SACCHARATE, AMPHETAMINE ASPARTATE MONOHYDRATE, DEXTROAMPHETAMINE SULFATE AND AMPHETAMINE SULFATE 6.25; 6.25; 6.25; 6.25 MG/1; MG/1; MG/1; MG/1
25 CAPSULE, EXTENDED RELEASE ORAL EVERY MORNING
Qty: 30 CAPSULE | Refills: 0 | Status: SHIPPED | OUTPATIENT
Start: 2025-03-31

## 2025-03-31 NOTE — PROGRESS NOTES
"Chief Complaint  Cough, Med Refill, and Contraception    Subjective        Trupti Rae presents to [facility]  History of Present Illness  She feels like Adderall XR 20 mg is wearing off too early and she has return of symptoms later in the day - poor concentration, easily distracted.   Cough  This is a chronic problem. The current episode started more than 1 month ago. The problem has been unchanged. The problem occurs every few minutes. The cough is Dry. Pertinent negatives include no chest pain, chills, fever, headaches, hemoptysis, nasal congestion or rhinorrhea. The symptoms are aggravated by fumes (Vaping). Risk factors for lung disease include smoking/tobacco exposure.   Contraception  Visit:  Follow-up  Contraceptive compliance:  Most of the time  Compliance problems:  None  Treatments tried:  DepoProvera, birth control pills and Nexaplanon  Risk factors:  No known risk factors        Objective   Vital Signs:  /74 (BP Location: Right arm, Patient Position: Sitting, Cuff Size: Adult)   Pulse 99   Temp 97.5 °F (36.4 °C) (Temporal)   Resp 18   Ht 170.2 cm (67\")   Wt 110 kg (243 lb 6.4 oz)   SpO2 96%   BMI 38.12 kg/m²   Estimated body mass index is 38.12 kg/m² as calculated from the following:    Height as of this encounter: 170.2 cm (67\").    Weight as of this encounter: 110 kg (243 lb 6.4 oz).    Class 2 Severe Obesity (BMI >=35 and <=39.9). Obesity-related health conditions include the following: none. Obesity is unchanged. BMI is is above average; BMI management plan is completed. We discussed increasing exercise.      Physical Exam  Vitals and nursing note reviewed.   Constitutional:       General: She is not in acute distress.     Appearance: She is well-developed.   HENT:      Head: Normocephalic.   Eyes:      General: Lids are normal.      Conjunctiva/sclera: Conjunctivae normal.   Neck:      Thyroid: No thyroid mass or thyromegaly.      Trachea: Trachea " normal.   Cardiovascular:      Rate and Rhythm: Normal rate and regular rhythm.      Heart sounds: Normal heart sounds.   Pulmonary:      Effort: Pulmonary effort is normal.      Breath sounds: Normal breath sounds.   Abdominal:      Palpations: Abdomen is soft.   Musculoskeletal:      Cervical back: Normal range of motion.   Lymphadenopathy:      Cervical: No cervical adenopathy.   Skin:     General: Skin is warm and dry.   Neurological:      Mental Status: She is alert and oriented to person, place, and time.   Psychiatric:         Attention and Perception: She is attentive.         Mood and Affect: Mood normal.         Speech: Speech normal.         Behavior: Behavior normal.        Result Review :  The following data was reviewed by: Barb Willingham MD on 03/31/2025:  Common labs          6/17/2024    14:12   Common Labs   Glucose 75    BUN 17    Creatinine 0.87    Sodium 140    Potassium 4.1    Chloride 106    Calcium 9.6    Albumin 4.6    Total Bilirubin <0.2    Alkaline Phosphatase 75    AST (SGOT) 29    ALT (SGPT) 31    WBC 11.27    Hemoglobin 14.5    Hematocrit 44.0    Platelets 347    Hemoglobin A1C 5.40                Assessment and Plan   Diagnoses and all orders for this visit:    1. Chronic cough (Primary)  -     XR Chest 2 View    2. Shortness of breath  -     XR Chest 2 View    3. Concentration deficit  Assessment & Plan:  Increase dose of Adderall from 20 to 25 mg.    Orders:  -     amphetamine-dextroamphetamine XR (Adderall XR) 25 MG 24 hr capsule; Take 1 capsule by mouth Every Morning  Dispense: 30 capsule; Refill: 0    4. Encounter for initial prescription of contraceptive pills  -     levonorgestrel-ethinyl estradiol (SEASONALE) 0.15-0.03 MG per tablet; Take 1 tablet by mouth Daily.  Dispense: 28 tablet; Refill: 12    5. Chronic pain of right knee  Assessment & Plan:  Apply a compressive ACE bandage. Rest and elevate the affected painful area.  Apply cold compresses intermittently as needed.   As pain recedes, begin normal activities slowly as tolerated.  Call if symptoms persist.      Orders:  -     XR Knee 3 View Right; Future    6. Engages in vaping  Assessment & Plan:  Encouraged cessation    Orders:  -     XR Chest 2 View             Follow Up   No follow-ups on file.  Patient was given instructions and counseling regarding her condition or for health maintenance advice. Please see specific information pulled into the AVS if appropriate.

## 2025-04-10 ENCOUNTER — OFFICE VISIT (OUTPATIENT)
Dept: FAMILY MEDICINE CLINIC | Facility: CLINIC | Age: 20
End: 2025-04-10
Payer: COMMERCIAL

## 2025-04-10 VITALS
TEMPERATURE: 97.2 F | BODY MASS INDEX: 37.73 KG/M2 | WEIGHT: 240.4 LBS | OXYGEN SATURATION: 98 % | HEIGHT: 67 IN | HEART RATE: 75 BPM | DIASTOLIC BLOOD PRESSURE: 62 MMHG | RESPIRATION RATE: 18 BRPM | SYSTOLIC BLOOD PRESSURE: 100 MMHG

## 2025-04-10 DIAGNOSIS — R41.840 CONCENTRATION DEFICIT: ICD-10-CM

## 2025-04-10 DIAGNOSIS — B34.9 ACUTE VIRAL SYNDROME: Primary | ICD-10-CM

## 2025-04-10 LAB
EXPIRATION DATE: NORMAL
FLUAV AG UPPER RESP QL IA.RAPID: NOT DETECTED
FLUBV AG UPPER RESP QL IA.RAPID: NOT DETECTED
INTERNAL CONTROL: NORMAL
Lab: NORMAL
SARS-COV-2 AG UPPER RESP QL IA.RAPID: NOT DETECTED

## 2025-04-10 PROCEDURE — 99213 OFFICE O/P EST LOW 20 MIN: CPT | Performed by: NURSE PRACTITIONER

## 2025-04-10 PROCEDURE — 87428 SARSCOV & INF VIR A&B AG IA: CPT | Performed by: NURSE PRACTITIONER

## 2025-04-10 RX ORDER — DEXTROAMPHETAMINE SACCHARATE, AMPHETAMINE ASPARTATE MONOHYDRATE, DEXTROAMPHETAMINE SULFATE AND AMPHETAMINE SULFATE 6.25; 6.25; 6.25; 6.25 MG/1; MG/1; MG/1; MG/1
25 CAPSULE, EXTENDED RELEASE ORAL EVERY MORNING
Qty: 30 CAPSULE | Refills: 0 | Status: CANCELLED | OUTPATIENT
Start: 2025-04-10

## 2025-04-10 RX ORDER — ONDANSETRON 4 MG/1
4 TABLET, FILM COATED ORAL EVERY 8 HOURS PRN
Qty: 15 TABLET | Refills: 0 | Status: SHIPPED | OUTPATIENT
Start: 2025-04-10

## 2025-04-10 NOTE — PROGRESS NOTES
"Chief Complaint  Nausea (Started last night with nausea and vomiting and some muscle pain. No fever. )    Subjective        Trupti Rae presents to [facility]  History of Present Illness    Onset of symptoms 1 day ago. Severity of symptoms are mild. Status is improved. Frequency is persistent. 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 Started contraceptive week ago-taken in the past with no side effects.  Symptoms are aggravated by nothing.  Symptoms are relieved by nothing.  Associated symptoms include nasal congestion, runny nose, left ear pressure, nausea, vomiting (4x), diarrhea (4x), and myalgia. Pertinent negatives include chills, cough, dyspnea, facial pain, fatigue, fever, headache, hemoptysis, otalgia, pharyngitis, post nasal drainage, rash, sinus pressure, sputum, tooth pain, wheezing, pleuritic pain        Objective   Vital Signs:  /62 (BP Location: Right arm, Patient Position: Sitting, Cuff Size: Large Adult)   Pulse 75   Temp 97.2 °F (36.2 °C) (Temporal)   Resp 18   Ht 170.2 cm (67\")   Wt 109 kg (240 lb 6.4 oz)   SpO2 98%   BMI 37.65 kg/m²   Estimated body mass index is 37.65 kg/m² as calculated from the following:    Height as of this encounter: 170.2 cm (67\").    Weight as of this encounter: 109 kg (240 lb 6.4 oz).            Physical Exam  Constitutional:       General: She is not in acute distress.  HENT:      Head: Normocephalic and atraumatic.      Right Ear: Tympanic membrane, ear canal and external ear normal.      Left Ear: Tympanic membrane, ear canal and external ear normal.      Nose: Nose normal.      Mouth/Throat:      Lips: Pink.      Mouth: Mucous membranes are moist.      Pharynx: No posterior oropharyngeal erythema.   Eyes:      Conjunctiva/sclera: Conjunctivae normal.   Cardiovascular:      Rate and Rhythm: Normal rate and regular rhythm.      Heart sounds: Normal heart sounds, S1 normal " and S2 normal.   Pulmonary:      Effort: Pulmonary effort is normal.      Breath sounds: Normal breath sounds.   Abdominal:      General: Bowel sounds are increased.      Palpations: Abdomen is soft.      Tenderness: There is no abdominal tenderness.   Musculoskeletal:      Cervical back: Neck supple.   Lymphadenopathy:      Cervical: No cervical adenopathy.   Skin:     General: Skin is warm and dry.      Findings: No rash.   Neurological:      Mental Status: She is alert and oriented to person, place, and time.      Gait: Gait is intact.   Psychiatric:         Mood and Affect: Mood normal.         Thought Content: Thought content normal.        Result Review :                Assessment and Plan   Diagnoses and all orders for this visit:    1. Acute viral syndrome (Primary)  -     POCT SARS-CoV-2 Antigen KANDIS + Flu  -     ondansetron (Zofran) 4 MG tablet; Take 1 tablet by mouth Every 8 (Eight) Hours As Needed for Nausea or Vomiting.  Dispense: 15 tablet; Refill: 0    Rapid flu negative.  Rapid COVID-negative.  Encouraged oral rehydration solution, rest, Tylenol as needed.         Follow Up   Return if symptoms worsen or fail to improve.  Patient was given instructions and counseling regarding her condition or for health maintenance advice. Please see specific information pulled into the AVS if appropriate.

## 2025-05-14 DIAGNOSIS — R41.840 CONCENTRATION DEFICIT: ICD-10-CM

## 2025-05-14 RX ORDER — DEXTROAMPHETAMINE SACCHARATE, AMPHETAMINE ASPARTATE MONOHYDRATE, DEXTROAMPHETAMINE SULFATE AND AMPHETAMINE SULFATE 6.25; 6.25; 6.25; 6.25 MG/1; MG/1; MG/1; MG/1
25 CAPSULE, EXTENDED RELEASE ORAL EVERY MORNING
Qty: 7 CAPSULE | Refills: 0 | Status: SHIPPED | OUTPATIENT
Start: 2025-05-14

## 2025-05-14 NOTE — TELEPHONE ENCOUNTER
Caller: Trupti Rae    Relationship: Self    Best call back number: [phone]     Requested Prescriptions:   Requested Prescriptions     Pending Prescriptions Disp Refills    amphetamine-dextroamphetamine XR (Adderall XR) 25 MG 24 hr capsule 30 capsule 0     Sig: Take 1 capsule by mouth Every Morning        Pharm[facility] where request should be sent: [id number] - [place], [place] - [address] [facility] [address] [facility] & [facility] [phone] [facility] [phone] FX     Last office visit with prescribing clinician: 3/31/2025   Last telemedicine visit with prescribing clinician: Visit date not found   Next office visit with prescribing clinician: Visit date not found     Additional details provided by patient:     Does the patient have less than a 3 day supply:  [] Yes  [] No    Would you like a call back once the refill request has been completed: [] Yes [] No    If the office needs to give you a call back, can they leave a voicemail: [] Yes [] No    Latoya Anthony Rep   05/14/25 08:33 EDT

## 2025-05-27 DIAGNOSIS — R41.840 CONCENTRATION DEFICIT: ICD-10-CM

## 2025-05-27 RX ORDER — DEXTROAMPHETAMINE SACCHARATE, AMPHETAMINE ASPARTATE MONOHYDRATE, DEXTROAMPHETAMINE SULFATE AND AMPHETAMINE SULFATE 6.25; 6.25; 6.25; 6.25 MG/1; MG/1; MG/1; MG/1
25 CAPSULE, EXTENDED RELEASE ORAL EVERY MORNING
Qty: 7 CAPSULE | Refills: 0 | OUTPATIENT
Start: 2025-05-27

## 2025-05-27 RX ORDER — DEXTROAMPHETAMINE SACCHARATE, AMPHETAMINE ASPARTATE MONOHYDRATE, DEXTROAMPHETAMINE SULFATE AND AMPHETAMINE SULFATE 6.25; 6.25; 6.25; 6.25 MG/1; MG/1; MG/1; MG/1
25 CAPSULE, EXTENDED RELEASE ORAL EVERY MORNING
Qty: 30 CAPSULE | Refills: 0 | Status: SHIPPED | OUTPATIENT
Start: 2025-05-27

## 2025-06-20 ENCOUNTER — TELEPHONE (OUTPATIENT)
Dept: FAMILY MEDICINE CLINIC | Facility: CLINIC | Age: 20
End: 2025-06-20

## 2025-06-20 NOTE — TELEPHONE ENCOUNTER
Caller: DELIA LOPEZ    Relationship: Mother    Best call back number: [phone]     Which medication are you concerned about: amphetamine-dextroamphetamine XR (Adderall XR) 25 MG 24 hr capsule     Who prescribed you this medication:MARGARITA VERGARA     When did you start taking this medication:     What are your concerns: HEADACHE,STOMACH PAIN,BODY ACHES    How long have you had these concerns:

## 2025-06-24 ENCOUNTER — OFFICE VISIT (OUTPATIENT)
Dept: FAMILY MEDICINE CLINIC | Facility: CLINIC | Age: 20
End: 2025-06-24
Payer: COMMERCIAL

## 2025-06-24 VITALS
WEIGHT: 232 LBS | HEART RATE: 92 BPM | TEMPERATURE: 98.2 F | HEIGHT: 67 IN | DIASTOLIC BLOOD PRESSURE: 75 MMHG | SYSTOLIC BLOOD PRESSURE: 114 MMHG | OXYGEN SATURATION: 96 % | BODY MASS INDEX: 36.41 KG/M2

## 2025-06-24 DIAGNOSIS — R41.840 CONCENTRATION DEFICIT: ICD-10-CM

## 2025-06-24 DIAGNOSIS — G44.209 ACUTE NON INTRACTABLE TENSION-TYPE HEADACHE: ICD-10-CM

## 2025-06-24 DIAGNOSIS — R11.0 NAUSEA: Primary | ICD-10-CM

## 2025-06-24 PROCEDURE — 99213 OFFICE O/P EST LOW 20 MIN: CPT | Performed by: FAMILY MEDICINE

## 2025-06-24 RX ORDER — DEXTROAMPHETAMINE SACCHARATE, AMPHETAMINE ASPARTATE MONOHYDRATE, DEXTROAMPHETAMINE SULFATE AND AMPHETAMINE SULFATE 5; 5; 5; 5 MG/1; MG/1; MG/1; MG/1
20 CAPSULE, EXTENDED RELEASE ORAL EVERY MORNING
Qty: 30 CAPSULE | Refills: 0 | Status: SHIPPED | OUTPATIENT
Start: 2025-06-24

## 2025-06-24 NOTE — ASSESSMENT & PLAN NOTE
Adderall is helping but she thinks she is having side effects from the increased dose.  Decrease dose back to 20 mg and see how she feels.

## 2025-06-24 NOTE — PROGRESS NOTES
"Chief Complaint  Med Management (Believes Adderall is making her sick)    Subjective        Trupti Rae presents to [facility]  Nausea  Chronicity:  New  Onset:  1 to 4 weeks  Frequency:  Daily  Progression since onset:  Unchanged  Symptoms: headaches and nausea    Symptoms: no abdominal pain, no chest pain, no diaphoresis, no myalgias, no neck pain, no swollen glands, no dysuria and no vomiting    Additional information:  She has had headache and nausea since increasing the dose of her Adderall from 20 mg to 25mg.  She would like to try to go back down to the 20 mg dose to see if the side effects will subside.       Objective   Vital Signs:  /75 (BP Location: Right arm, Patient Position: Sitting, Cuff Size: Adult)   Pulse 92   Temp 98.2 °F (36.8 °C) (Temporal)   Ht 170.2 cm (67\")   Wt 105 kg (232 lb)   SpO2 96%   BMI 36.34 kg/m²   Estimated body mass index is 36.34 kg/m² as calculated from the following:    Height as of this encounter: 170.2 cm (67\").    Weight as of this encounter: 105 kg (232 lb).            Physical Exam  Constitutional:       General: She is not in acute distress.     Appearance: She is well-developed.   HENT:      Head: Normocephalic.   Eyes:      General: Lids are normal.      Conjunctiva/sclera: Conjunctivae normal.   Neck:      Thyroid: No thyroid mass or thyromegaly.      Trachea: Trachea normal.   Cardiovascular:      Rate and Rhythm: Normal rate and regular rhythm.      Heart sounds: Normal heart sounds.   Pulmonary:      Effort: Pulmonary effort is normal.      Breath sounds: Normal breath sounds.   Abdominal:      Palpations: Abdomen is soft.   Musculoskeletal:      Cervical back: Normal range of motion.   Lymphadenopathy:      Cervical: No cervical adenopathy.   Skin:     General: Skin is warm and dry.   Neurological:      Mental Status: She is alert and oriented to person, place, and time.   Psychiatric:         Attention and " Perception: She is attentive.         Mood and Affect: Mood normal.         Speech: Speech normal.         Behavior: Behavior normal.        Result Review :  The following data was reviewed by: Barb Willingham MD on 06/24/2025:              Assessment and Plan   Diagnoses and all orders for this visit:    1. Nausea (Primary)    2. Acute non intractable tension-type headache  Assessment & Plan:  Tylenol 1-2 tabs po q4h prn                3. Concentration deficit  Assessment & Plan:  Adderall is helping but she thinks she is having side effects from the increased dose.  Decrease dose back to 20 mg and see how she feels.     Orders:  -     amphetamine-dextroamphetamine XR (Adderall XR) 20 MG 24 hr capsule; Take 1 capsule by mouth Every Morning  Dispense: 30 capsule; Refill: 0             Follow Up   No follow-ups on file.  Patient was given instructions and counseling regarding her condition or for health maintenance advice. Please see specific information pulled into the AVS if appropriate.

## 2025-07-28 DIAGNOSIS — R41.840 CONCENTRATION DEFICIT: ICD-10-CM

## 2025-07-28 RX ORDER — DEXTROAMPHETAMINE SACCHARATE, AMPHETAMINE ASPARTATE MONOHYDRATE, DEXTROAMPHETAMINE SULFATE AND AMPHETAMINE SULFATE 5; 5; 5; 5 MG/1; MG/1; MG/1; MG/1
20 CAPSULE, EXTENDED RELEASE ORAL EVERY MORNING
Qty: 30 CAPSULE | Refills: 0 | Status: SHIPPED | OUTPATIENT
Start: 2025-07-28

## 2025-08-08 ENCOUNTER — OFFICE VISIT (OUTPATIENT)
Dept: FAMILY MEDICINE CLINIC | Facility: CLINIC | Age: 20
End: 2025-08-08
Payer: COMMERCIAL

## 2025-08-08 VITALS
BODY MASS INDEX: 37.04 KG/M2 | HEART RATE: 90 BPM | HEIGHT: 67 IN | DIASTOLIC BLOOD PRESSURE: 88 MMHG | OXYGEN SATURATION: 98 % | SYSTOLIC BLOOD PRESSURE: 127 MMHG | TEMPERATURE: 98.2 F | WEIGHT: 236 LBS

## 2025-08-08 DIAGNOSIS — N93.9 VAGINAL BLEEDING: ICD-10-CM

## 2025-08-08 DIAGNOSIS — N92.1 MENORRHAGIA WITH IRREGULAR CYCLE: Primary | ICD-10-CM

## 2025-08-08 LAB
B-HCG UR QL: NEGATIVE
BILIRUB BLD-MCNC: NEGATIVE MG/DL
CLARITY, POC: CLEAR
COLOR UR: YELLOW
EXPIRATION DATE: NORMAL
EXPIRATION DATE: NORMAL
GLUCOSE UR STRIP-MCNC: NEGATIVE MG/DL
INTERNAL NEGATIVE CONTROL: NORMAL
INTERNAL POSITIVE CONTROL: NORMAL
KETONES UR QL: NEGATIVE
LEUKOCYTE EST, POC: NEGATIVE
Lab: NORMAL
Lab: NORMAL
NITRITE UR-MCNC: NEGATIVE MG/ML
PH UR: 6 [PH] (ref 5–8)
PROT UR STRIP-MCNC: NEGATIVE MG/DL
RBC # UR STRIP: NEGATIVE /UL
SP GR UR: 1.01 (ref 1–1.03)
UROBILINOGEN UR QL: NORMAL

## 2025-08-08 PROCEDURE — 81003 URINALYSIS AUTO W/O SCOPE: CPT | Performed by: FAMILY MEDICINE

## 2025-08-08 PROCEDURE — 81025 URINE PREGNANCY TEST: CPT | Performed by: FAMILY MEDICINE

## 2025-08-08 PROCEDURE — 99213 OFFICE O/P EST LOW 20 MIN: CPT | Performed by: FAMILY MEDICINE

## 2025-08-08 RX ORDER — TRANEXAMIC ACID 650 MG/1
1300 TABLET ORAL 3 TIMES DAILY PRN
Qty: 30 TABLET | Refills: 0 | Status: SHIPPED | OUTPATIENT
Start: 2025-08-08

## 2025-08-12 LAB
AGE GDLN ACOG TESTING: NORMAL
CONV .: NORMAL
CYTOLOGIST CVX/VAG CYTO: NORMAL
CYTOLOGY CVX/VAG DOC CYTO: NORMAL
CYTOLOGY CVX/VAG DOC THIN PREP: NORMAL
DX ICD CODE: NORMAL
OTHER STN SPEC: NORMAL
SERVICE CMNT-IMP: NORMAL
STAT OF ADQ CVX/VAG CYTO-IMP: NORMAL

## 2025-08-20 ENCOUNTER — TELEPHONE (OUTPATIENT)
Dept: FAMILY MEDICINE CLINIC | Facility: CLINIC | Age: 20
End: 2025-08-20
Payer: COMMERCIAL

## 2025-08-20 DIAGNOSIS — R41.840 CONCENTRATION DEFICIT: Primary | ICD-10-CM

## 2025-08-21 RX ORDER — DEXTROAMPHETAMINE SACCHARATE, AMPHETAMINE ASPARTATE MONOHYDRATE, DEXTROAMPHETAMINE SULFATE AND AMPHETAMINE SULFATE 6.25; 6.25; 6.25; 6.25 MG/1; MG/1; MG/1; MG/1
25 CAPSULE, EXTENDED RELEASE ORAL EVERY MORNING
Qty: 30 CAPSULE | Refills: 0 | Status: SHIPPED | OUTPATIENT
Start: 2025-08-21